# Patient Record
Sex: MALE
[De-identification: names, ages, dates, MRNs, and addresses within clinical notes are randomized per-mention and may not be internally consistent; named-entity substitution may affect disease eponyms.]

---

## 2017-01-23 ENCOUNTER — APPOINTMENT (OUTPATIENT)
Dept: ELECTROPHYSIOLOGY | Facility: CLINIC | Age: 57
End: 2017-01-23

## 2017-02-27 ENCOUNTER — APPOINTMENT (OUTPATIENT)
Dept: ELECTROPHYSIOLOGY | Facility: CLINIC | Age: 57
End: 2017-02-27

## 2017-03-30 ENCOUNTER — APPOINTMENT (OUTPATIENT)
Dept: ELECTROPHYSIOLOGY | Facility: CLINIC | Age: 57
End: 2017-03-30

## 2017-05-02 ENCOUNTER — APPOINTMENT (OUTPATIENT)
Dept: ELECTROPHYSIOLOGY | Facility: CLINIC | Age: 57
End: 2017-05-02

## 2017-07-05 ENCOUNTER — APPOINTMENT (OUTPATIENT)
Dept: ELECTROPHYSIOLOGY | Facility: CLINIC | Age: 57
End: 2017-07-05
Payer: COMMERCIAL

## 2017-07-05 VITALS — SYSTOLIC BLOOD PRESSURE: 135 MMHG | DIASTOLIC BLOOD PRESSURE: 86 MMHG

## 2017-07-05 PROCEDURE — 93291 INTERROG DEV EVAL SCRMS IP: CPT

## 2017-08-08 ENCOUNTER — APPOINTMENT (OUTPATIENT)
Dept: ELECTROPHYSIOLOGY | Facility: CLINIC | Age: 57
End: 2017-08-08
Payer: COMMERCIAL

## 2017-08-08 PROCEDURE — 93298 REM INTERROG DEV EVAL SCRMS: CPT

## 2017-08-08 PROCEDURE — 93299: CPT

## 2017-09-12 ENCOUNTER — APPOINTMENT (OUTPATIENT)
Dept: ELECTROPHYSIOLOGY | Facility: CLINIC | Age: 57
End: 2017-09-12
Payer: COMMERCIAL

## 2017-09-12 PROCEDURE — 93298 REM INTERROG DEV EVAL SCRMS: CPT

## 2017-09-12 PROCEDURE — 93299: CPT

## 2017-10-16 ENCOUNTER — APPOINTMENT (OUTPATIENT)
Dept: ELECTROPHYSIOLOGY | Facility: CLINIC | Age: 57
End: 2017-10-16
Payer: COMMERCIAL

## 2017-10-16 PROCEDURE — 93299: CPT

## 2017-10-16 PROCEDURE — 93298 REM INTERROG DEV EVAL SCRMS: CPT

## 2017-11-21 ENCOUNTER — APPOINTMENT (OUTPATIENT)
Dept: ELECTROPHYSIOLOGY | Facility: CLINIC | Age: 57
End: 2017-11-21
Payer: COMMERCIAL

## 2017-11-21 PROCEDURE — 93299: CPT

## 2017-11-21 PROCEDURE — 93298 REM INTERROG DEV EVAL SCRMS: CPT

## 2017-12-15 ENCOUNTER — MESSAGE (OUTPATIENT)
Age: 57
End: 2017-12-15

## 2017-12-18 ENCOUNTER — MESSAGE (OUTPATIENT)
Age: 57
End: 2017-12-18

## 2018-01-08 ENCOUNTER — APPOINTMENT (OUTPATIENT)
Dept: ELECTROPHYSIOLOGY | Facility: CLINIC | Age: 58
End: 2018-01-08
Payer: COMMERCIAL

## 2018-01-08 PROCEDURE — 93298 REM INTERROG DEV EVAL SCRMS: CPT

## 2018-01-08 PROCEDURE — 93299: CPT

## 2018-02-07 ENCOUNTER — APPOINTMENT (OUTPATIENT)
Dept: ELECTROPHYSIOLOGY | Facility: CLINIC | Age: 58
End: 2018-02-07
Payer: COMMERCIAL

## 2018-02-07 PROCEDURE — 93285 PRGRMG DEV EVAL SCRMS IP: CPT

## 2018-03-01 ENCOUNTER — APPOINTMENT (OUTPATIENT)
Dept: CARDIOLOGY | Facility: CLINIC | Age: 58
End: 2018-03-01

## 2018-03-06 ENCOUNTER — APPOINTMENT (OUTPATIENT)
Dept: ELECTROPHYSIOLOGY | Facility: CLINIC | Age: 58
End: 2018-03-06
Payer: COMMERCIAL

## 2018-03-12 ENCOUNTER — APPOINTMENT (OUTPATIENT)
Dept: ELECTROPHYSIOLOGY | Facility: CLINIC | Age: 58
End: 2018-03-12
Payer: COMMERCIAL

## 2018-03-12 PROCEDURE — 93298 REM INTERROG DEV EVAL SCRMS: CPT

## 2018-03-12 PROCEDURE — 93299: CPT

## 2018-03-20 ENCOUNTER — NON-APPOINTMENT (OUTPATIENT)
Age: 58
End: 2018-03-20

## 2018-03-20 ENCOUNTER — APPOINTMENT (OUTPATIENT)
Dept: ELECTROPHYSIOLOGY | Facility: CLINIC | Age: 58
End: 2018-03-20
Payer: COMMERCIAL

## 2018-03-20 VITALS
OXYGEN SATURATION: 96 % | DIASTOLIC BLOOD PRESSURE: 94 MMHG | BODY MASS INDEX: 30.34 KG/M2 | SYSTOLIC BLOOD PRESSURE: 148 MMHG | HEIGHT: 72 IN | HEART RATE: 85 BPM | WEIGHT: 224 LBS

## 2018-03-20 PROCEDURE — 99214 OFFICE O/P EST MOD 30 MIN: CPT

## 2018-03-20 PROCEDURE — 93000 ELECTROCARDIOGRAM COMPLETE: CPT

## 2018-04-23 ENCOUNTER — APPOINTMENT (OUTPATIENT)
Dept: ELECTROPHYSIOLOGY | Facility: CLINIC | Age: 58
End: 2018-04-23

## 2018-05-24 ENCOUNTER — OUTPATIENT (OUTPATIENT)
Dept: OUTPATIENT SERVICES | Facility: HOSPITAL | Age: 58
LOS: 1 days | Discharge: HOME | End: 2018-05-24
Payer: COMMERCIAL

## 2018-05-24 DIAGNOSIS — Z98.2 PRESENCE OF CEREBROSPINAL FLUID DRAINAGE DEVICE: Chronic | ICD-10-CM

## 2018-05-24 DIAGNOSIS — I48.0 PAROXYSMAL ATRIAL FIBRILLATION: ICD-10-CM

## 2018-05-24 PROCEDURE — 33284: CPT

## 2018-05-24 PROCEDURE — 33282: CPT | Mod: 59

## 2018-05-24 PROCEDURE — 93010 ELECTROCARDIOGRAM REPORT: CPT

## 2018-05-24 RX ORDER — SODIUM CHLORIDE 9 MG/ML
3 INJECTION INTRAMUSCULAR; INTRAVENOUS; SUBCUTANEOUS EVERY 8 HOURS
Qty: 0 | Refills: 0 | Status: DISCONTINUED | OUTPATIENT
Start: 2018-05-24 | End: 2018-06-08

## 2018-05-24 NOTE — CHART NOTE - NSCHARTNOTEFT_GEN_A_CORE
Removal of old ILR and implantation of new ILR  : Phong Davila MD  Assistant: none  Description of procedure: local anesthesia; removed old ILR and inserted new ILR after polymyxin irrigation  findings: Old ILR at [facility]; new ILR implanted  EBL: < 10 cc  Specimen removed: old ILR  no complications  Anesthesia: local only.     Phong Davila MD

## 2018-05-24 NOTE — H&P CARDIOLOGY - FAMILY HISTORY
Father  Still living? No  Family history of heart attack, Age at diagnosis: Age Unknown     Mother  Still living? No  Family history of heart attack, Age at diagnosis: Age Unknown

## 2018-05-24 NOTE — H&P CARDIOLOGY - PMH
Brain aneurysm  in 2013  Hypertension, unspecified type    PAF (paroxysmal atrial fibrillation)  not on AC, has ILR since 2014  Seizure disorder  ? last episode 5/21/18  Syncope, unspecified syncope type

## 2018-05-24 NOTE — H&P CARDIOLOGY - HISTORY OF PRESENT ILLNESS
57 y.o. male presents today for elective ILR explant and ILR implant since the device is at [facility].   The patient c/o syncopal episode on 5/21/18 while he was standing at work, at 8 am.  The patient denies chest pain, SOB, palpitations, dizziness, b/l lower extremities edema, presyncope, syncope, melena, hematochezia, fever, chills, abdominal pain, N/v/D/C, urinary symptoms.  The patient was evaluated in [facility], doesn't remember if he had CT or MRI, but claims that had many tests done and was told  that all were normal. He was d/c home the same day. The patient was evaluated by his neurologist yesterday, " seizure", schedule for EEG next week. Denies any recurrent syncopal episodes. 57 y.o. male presents today for elective ILR explant and ILR implant since the device is at [facility]. AF was recorded x 1 in 2017.   The patient c/o syncopal episode on 5/21/18 while he was standing at work, at 8 am.  The patient denies chest pain, SOB, palpitations, dizziness prior to episode. As per patient, he doesn't know how long he was unconscious and doesn't remember anything, however his co worker saw him to pass out and claimed that it was not too long and that the patient regained his consciousness and was talking before ambulance arrived.   The patient denies melena, hematochezia, fever, chills, abdominal pain, N/v/D/C, urinary symptoms.  The patient was evaluated in [facility], doesn't remember if he had CT or MRI, but claims that had many tests done and was told  that all were normal. Sutures x3 placed on R temporal area. He was d/c home the same day. The patient was evaluated by his neurologist yesterday, ?? episode of seizure, scheduled for EEG next week. Denies any recurrent syncopal episodes. The patient denies any complaints at present.

## 2018-05-24 NOTE — H&P CARDIOLOGY - ADDITIONAL PE
The area of previously placed sutures is clean and dry. NO surrounding erythema or discharge, healed well.

## 2018-06-07 ENCOUNTER — APPOINTMENT (OUTPATIENT)
Dept: ELECTROPHYSIOLOGY | Facility: CLINIC | Age: 58
End: 2018-06-07
Payer: COMMERCIAL

## 2018-06-07 PROCEDURE — 99024 POSTOP FOLLOW-UP VISIT: CPT

## 2018-07-09 ENCOUNTER — APPOINTMENT (OUTPATIENT)
Dept: ELECTROPHYSIOLOGY | Facility: CLINIC | Age: 58
End: 2018-07-09
Payer: COMMERCIAL

## 2018-07-09 PROCEDURE — 93299: CPT

## 2018-07-09 PROCEDURE — 93298 REM INTERROG DEV EVAL SCRMS: CPT

## 2018-07-17 PROBLEM — I48.0 PAROXYSMAL ATRIAL FIBRILLATION: Chronic | Status: ACTIVE | Noted: 2018-05-24

## 2018-07-17 PROBLEM — I67.1 CEREBRAL ANEURYSM, NONRUPTURED: Chronic | Status: ACTIVE | Noted: 2018-05-24

## 2018-07-17 PROBLEM — G40.909 EPILEPSY, UNSPECIFIED, NOT INTRACTABLE, WITHOUT STATUS EPILEPTICUS: Chronic | Status: ACTIVE | Noted: 2018-05-24

## 2018-09-10 ENCOUNTER — APPOINTMENT (OUTPATIENT)
Dept: ELECTROPHYSIOLOGY | Facility: CLINIC | Age: 58
End: 2018-09-10
Payer: COMMERCIAL

## 2018-09-10 PROCEDURE — 93298 REM INTERROG DEV EVAL SCRMS: CPT

## 2018-09-10 PROCEDURE — 93299: CPT

## 2018-09-13 PROBLEM — R55 SYNCOPE AND COLLAPSE: Chronic | Status: ACTIVE | Noted: 2018-05-24

## 2018-09-13 PROBLEM — I10 ESSENTIAL (PRIMARY) HYPERTENSION: Chronic | Status: ACTIVE | Noted: 2018-05-24

## 2018-11-05 ENCOUNTER — APPOINTMENT (OUTPATIENT)
Dept: ELECTROPHYSIOLOGY | Facility: CLINIC | Age: 58
End: 2018-11-05
Payer: COMMERCIAL

## 2018-11-05 PROCEDURE — 93299: CPT

## 2018-11-05 PROCEDURE — 93298 REM INTERROG DEV EVAL SCRMS: CPT

## 2018-12-13 ENCOUNTER — APPOINTMENT (OUTPATIENT)
Dept: ELECTROPHYSIOLOGY | Facility: CLINIC | Age: 58
End: 2018-12-13
Payer: COMMERCIAL

## 2018-12-13 VITALS — SYSTOLIC BLOOD PRESSURE: 146 MMHG | RESPIRATION RATE: 14 BRPM | DIASTOLIC BLOOD PRESSURE: 93 MMHG | HEART RATE: 75 BPM

## 2018-12-13 PROCEDURE — 93285 PRGRMG DEV EVAL SCRMS IP: CPT

## 2019-01-22 ENCOUNTER — APPOINTMENT (OUTPATIENT)
Dept: ELECTROPHYSIOLOGY | Facility: CLINIC | Age: 59
End: 2019-01-22
Payer: COMMERCIAL

## 2019-01-22 PROCEDURE — 93299: CPT

## 2019-01-22 PROCEDURE — 93298 REM INTERROG DEV EVAL SCRMS: CPT

## 2019-02-13 NOTE — H&P CARDIOLOGY - NS MD HP PULSE FEMORAL
Telephone Encounter by Yamileth Jorgensen RN at 11/05/18 04:08 PM     Author:  Yamileth Jorgensen RN Service:  (none) Author Type:  Registered Nurse     Filed:  11/05/18 04:09 PM Encounter Date:  11/5/2018 Status:  Signed     :  Yamileth Jorgensen RN (Registered Nurse)            Patient notified.[AH1.1T] Given message below, med list updated, patient booked to see[AH1.1M] [AH1.1T] on Thursday to further evaluate.[AH1.1M]       Revision History        User Key Date/Time User Provider Type Action    > AH1.1 11/05/18 04:09 PM Yamileth Jorgensen RN Registered Nurse Sign    M - Manual, T - Template             right normal/left normal

## 2019-02-25 ENCOUNTER — APPOINTMENT (OUTPATIENT)
Dept: ELECTROPHYSIOLOGY | Facility: CLINIC | Age: 59
End: 2019-02-25
Payer: COMMERCIAL

## 2019-02-25 PROCEDURE — 93299: CPT

## 2019-02-25 PROCEDURE — 93298 REM INTERROG DEV EVAL SCRMS: CPT

## 2019-03-26 ENCOUNTER — APPOINTMENT (OUTPATIENT)
Dept: ELECTROPHYSIOLOGY | Facility: CLINIC | Age: 59
End: 2019-03-26
Payer: COMMERCIAL

## 2019-03-26 PROCEDURE — 93298 REM INTERROG DEV EVAL SCRMS: CPT

## 2019-03-26 PROCEDURE — 93299: CPT

## 2019-04-29 ENCOUNTER — APPOINTMENT (OUTPATIENT)
Dept: ELECTROPHYSIOLOGY | Facility: CLINIC | Age: 59
End: 2019-04-29
Payer: COMMERCIAL

## 2019-04-29 PROCEDURE — 93298 REM INTERROG DEV EVAL SCRMS: CPT

## 2019-04-29 PROCEDURE — 93299: CPT

## 2019-05-30 ENCOUNTER — APPOINTMENT (OUTPATIENT)
Dept: ELECTROPHYSIOLOGY | Facility: CLINIC | Age: 59
End: 2019-05-30
Payer: COMMERCIAL

## 2019-05-30 PROCEDURE — 93299: CPT

## 2019-05-30 PROCEDURE — 93298 REM INTERROG DEV EVAL SCRMS: CPT

## 2019-06-24 ENCOUNTER — APPOINTMENT (OUTPATIENT)
Dept: ELECTROPHYSIOLOGY | Facility: CLINIC | Age: 59
End: 2019-06-24
Payer: COMMERCIAL

## 2019-06-24 PROCEDURE — 93285 PRGRMG DEV EVAL SCRMS IP: CPT

## 2019-07-25 ENCOUNTER — APPOINTMENT (OUTPATIENT)
Dept: ELECTROPHYSIOLOGY | Facility: CLINIC | Age: 59
End: 2019-07-25

## 2019-08-14 ENCOUNTER — APPOINTMENT (OUTPATIENT)
Dept: ELECTROPHYSIOLOGY | Facility: CLINIC | Age: 59
End: 2019-08-14
Payer: COMMERCIAL

## 2019-08-14 PROCEDURE — 93299: CPT

## 2019-08-14 PROCEDURE — 93298 REM INTERROG DEV EVAL SCRMS: CPT

## 2019-09-16 ENCOUNTER — APPOINTMENT (OUTPATIENT)
Dept: ELECTROPHYSIOLOGY | Facility: CLINIC | Age: 59
End: 2019-09-16
Payer: COMMERCIAL

## 2019-09-16 PROCEDURE — 93298 REM INTERROG DEV EVAL SCRMS: CPT

## 2019-09-16 PROCEDURE — 93299: CPT

## 2019-10-17 ENCOUNTER — APPOINTMENT (OUTPATIENT)
Dept: ELECTROPHYSIOLOGY | Facility: CLINIC | Age: 59
End: 2019-10-17
Payer: COMMERCIAL

## 2019-10-17 PROCEDURE — 93298 REM INTERROG DEV EVAL SCRMS: CPT

## 2019-10-17 PROCEDURE — 93299: CPT

## 2019-11-18 ENCOUNTER — APPOINTMENT (OUTPATIENT)
Dept: ELECTROPHYSIOLOGY | Facility: CLINIC | Age: 59
End: 2019-11-18
Payer: COMMERCIAL

## 2019-11-18 PROCEDURE — 93299: CPT

## 2019-11-18 PROCEDURE — 93298 REM INTERROG DEV EVAL SCRMS: CPT

## 2019-12-18 ENCOUNTER — APPOINTMENT (OUTPATIENT)
Dept: ELECTROPHYSIOLOGY | Facility: CLINIC | Age: 59
End: 2019-12-18
Payer: COMMERCIAL

## 2019-12-18 PROCEDURE — 93298 REM INTERROG DEV EVAL SCRMS: CPT

## 2019-12-18 PROCEDURE — 93299: CPT

## 2019-12-23 ENCOUNTER — APPOINTMENT (OUTPATIENT)
Dept: ELECTROPHYSIOLOGY | Facility: CLINIC | Age: 59
End: 2019-12-23
Payer: COMMERCIAL

## 2019-12-23 DIAGNOSIS — I48.0 PAROXYSMAL ATRIAL FIBRILLATION: ICD-10-CM

## 2019-12-23 PROCEDURE — 93285 PRGRMG DEV EVAL SCRMS IP: CPT

## 2020-01-27 ENCOUNTER — APPOINTMENT (OUTPATIENT)
Dept: ELECTROPHYSIOLOGY | Facility: CLINIC | Age: 60
End: 2020-01-27
Payer: COMMERCIAL

## 2020-01-27 PROCEDURE — 93298 REM INTERROG DEV EVAL SCRMS: CPT

## 2020-01-27 PROCEDURE — G2066: CPT

## 2020-02-27 ENCOUNTER — APPOINTMENT (OUTPATIENT)
Dept: ELECTROPHYSIOLOGY | Facility: CLINIC | Age: 60
End: 2020-02-27
Payer: COMMERCIAL

## 2020-02-27 PROCEDURE — 93298 REM INTERROG DEV EVAL SCRMS: CPT

## 2020-02-27 PROCEDURE — G2066: CPT

## 2020-03-30 ENCOUNTER — APPOINTMENT (OUTPATIENT)
Dept: ELECTROPHYSIOLOGY | Facility: CLINIC | Age: 60
End: 2020-03-30
Payer: COMMERCIAL

## 2020-03-30 PROCEDURE — G2066: CPT

## 2020-03-30 PROCEDURE — 93298 REM INTERROG DEV EVAL SCRMS: CPT

## 2020-04-30 ENCOUNTER — APPOINTMENT (OUTPATIENT)
Dept: ELECTROPHYSIOLOGY | Facility: CLINIC | Age: 60
End: 2020-04-30
Payer: COMMERCIAL

## 2020-04-30 PROCEDURE — 93298 REM INTERROG DEV EVAL SCRMS: CPT

## 2020-04-30 PROCEDURE — G2066: CPT

## 2020-06-01 ENCOUNTER — APPOINTMENT (OUTPATIENT)
Dept: ELECTROPHYSIOLOGY | Facility: CLINIC | Age: 60
End: 2020-06-01
Payer: COMMERCIAL

## 2020-06-01 PROCEDURE — G2066: CPT

## 2020-06-01 PROCEDURE — 93298 REM INTERROG DEV EVAL SCRMS: CPT

## 2020-07-06 ENCOUNTER — APPOINTMENT (OUTPATIENT)
Dept: ELECTROPHYSIOLOGY | Facility: CLINIC | Age: 60
End: 2020-07-06
Payer: COMMERCIAL

## 2020-07-06 PROCEDURE — G2066: CPT

## 2020-07-06 PROCEDURE — 93298 REM INTERROG DEV EVAL SCRMS: CPT

## 2020-08-11 ENCOUNTER — APPOINTMENT (OUTPATIENT)
Dept: ELECTROPHYSIOLOGY | Facility: CLINIC | Age: 60
End: 2020-08-11
Payer: COMMERCIAL

## 2020-08-11 PROCEDURE — G2066: CPT

## 2020-08-11 PROCEDURE — 93298 REM INTERROG DEV EVAL SCRMS: CPT

## 2020-09-15 ENCOUNTER — APPOINTMENT (OUTPATIENT)
Dept: ELECTROPHYSIOLOGY | Facility: CLINIC | Age: 60
End: 2020-09-15
Payer: COMMERCIAL

## 2020-09-15 PROCEDURE — G2066: CPT

## 2020-09-15 PROCEDURE — 93298 REM INTERROG DEV EVAL SCRMS: CPT

## 2020-10-20 ENCOUNTER — APPOINTMENT (OUTPATIENT)
Dept: ELECTROPHYSIOLOGY | Facility: CLINIC | Age: 60
End: 2020-10-20
Payer: COMMERCIAL

## 2020-10-20 PROCEDURE — G2066: CPT

## 2020-10-20 PROCEDURE — 93298 REM INTERROG DEV EVAL SCRMS: CPT

## 2020-11-24 ENCOUNTER — APPOINTMENT (OUTPATIENT)
Dept: ELECTROPHYSIOLOGY | Facility: CLINIC | Age: 60
End: 2020-11-24
Payer: COMMERCIAL

## 2020-11-24 PROCEDURE — G2066: CPT

## 2020-11-24 PROCEDURE — 93298 REM INTERROG DEV EVAL SCRMS: CPT

## 2020-12-28 ENCOUNTER — APPOINTMENT (OUTPATIENT)
Dept: ELECTROPHYSIOLOGY | Facility: CLINIC | Age: 60
End: 2020-12-28
Payer: COMMERCIAL

## 2020-12-28 VITALS — SYSTOLIC BLOOD PRESSURE: 158 MMHG | DIASTOLIC BLOOD PRESSURE: 97 MMHG

## 2020-12-28 PROCEDURE — 99072 ADDL SUPL MATRL&STAF TM PHE: CPT

## 2020-12-28 PROCEDURE — 93285 PRGRMG DEV EVAL SCRMS IP: CPT

## 2021-01-03 ENCOUNTER — APPOINTMENT (OUTPATIENT)
Dept: DISASTER EMERGENCY | Facility: CLINIC | Age: 61
End: 2021-01-03

## 2021-01-04 LAB — SARS-COV-2 N GENE NPH QL NAA+PROBE: NOT DETECTED

## 2021-01-06 ENCOUNTER — OUTPATIENT (OUTPATIENT)
Dept: OUTPATIENT SERVICES | Facility: HOSPITAL | Age: 61
LOS: 1 days | Discharge: HOME | End: 2021-01-06
Payer: COMMERCIAL

## 2021-01-06 DIAGNOSIS — Z98.2 PRESENCE OF CEREBROSPINAL FLUID DRAINAGE DEVICE: Chronic | ICD-10-CM

## 2021-01-06 DIAGNOSIS — R55 SYNCOPE AND COLLAPSE: ICD-10-CM

## 2021-01-06 DIAGNOSIS — Z98.890 OTHER SPECIFIED POSTPROCEDURAL STATES: Chronic | ICD-10-CM

## 2021-01-06 PROCEDURE — 33285 INSJ SUBQ CAR RHYTHM MNTR: CPT

## 2021-01-06 RX ORDER — MULTIVIT-MIN/FERROUS GLUCONATE 9 MG/15 ML
1 LIQUID (ML) ORAL
Qty: 0 | Refills: 0 | DISCHARGE

## 2021-01-06 RX ORDER — LEVETIRACETAM 250 MG/1
1 TABLET, FILM COATED ORAL
Qty: 0 | Refills: 0 | DISCHARGE

## 2021-01-06 NOTE — H&P CARDIOLOGY - REVIEW OF SYSTEMS
The patient denies chest pain, SOB, palpitations, dizziness, presyncope, syncope,  headache, visual disturbances,  PE, DVT, abdominal pain, N/V/D/C, hematochezia, melena, dysuria, hematuria, fever, chills.

## 2021-01-06 NOTE — H&P CARDIOLOGY - HISTORY OF PRESENT ILLNESS
60 y.o. male presents today for elective ILR explant.   The patient was diagnosed with Brain aneurysm, had ILR placed in 2014 and replaced in 2017, was noted to have one episode of A. Fib in 2017. As per Dr. Davila, there was no evidence of recurrent A. Fib on the recording and the decision was made to remove ILR. The patient denies chest pain, SOB, palpitations, dizziness, presyncope, syncope,  headache, visual disturbances, PE, DVT, abdominal pain, N/V/D/C, hematochezia, melena, dysuria, hematuria, fever, chills.

## 2021-01-06 NOTE — CHART NOTE - NSCHARTNOTEFT_GEN_A_CORE
Type of Procedure: Implantable Loop Recorder Explant  Licensed independent practitioner: Phong Davila MD  Assistant: none  Description of procedure: sterile conditions, ILR explanted 4th ICS left parasternal area  Findings of procedure: Successful removal of ILR  Estimated blood loss: < 10 cc  Specimen removed: none  Preoperative Dx: ILR EOS; h/o syncope and PAF  Postoperative Dx: ILR EOS  Complications: none  Anesthesia type: local anesthesia    Phong Davila MD

## 2021-01-20 ENCOUNTER — APPOINTMENT (OUTPATIENT)
Dept: ELECTROPHYSIOLOGY | Facility: CLINIC | Age: 61
End: 2021-01-20

## 2021-02-01 ENCOUNTER — APPOINTMENT (OUTPATIENT)
Dept: ELECTROPHYSIOLOGY | Facility: CLINIC | Age: 61
End: 2021-02-01

## 2023-08-22 NOTE — H&P CARDIOLOGY - HIV STATUS
LABS:                        14.5   6.15  )-----------( 355      ( 21 Aug 2023 23:50 )             44.9     Hgb Trend: 14.5<--  08-21    141  |  104  |  20  ----------------------------<  102<H>  4.8   |  23  |  0.62    Ca    10.1      21 Aug 2023 23:50    TPro  7.4  /  Alb  4.7  /  TBili  0.2  /  DBili  x   /  AST  21  /  ALT  14  /  AlkPhos  32<L>  08-21    Creatinine Trend: 0.62<--  LIVER FUNCTIONS - ( 21 Aug 2023 23:50 )  Alb: 4.7 g/dL / Pro: 7.4 g/dL / ALK PHOS: 32 U/L / ALT: 14 U/L / AST: 21 U/L / GGT: x                 Urinalysis Basic - ( 21 Aug 2023 23:50 )    Color: x / Appearance: x / SG: x / pH: x  Gluc: 102 mg/dL / Ketone: x  / Bili: x / Urobili: x   Blood: x / Protein: x / Nitrite: x   Leuk Esterase: x / RBC: x / WBC x   Sq Epi: x / Non Sq Epi: x / Bacteria: x
Negative/Unknown

## 2024-03-12 ENCOUNTER — APPOINTMENT (OUTPATIENT)
Dept: UROLOGY | Facility: CLINIC | Age: 64
End: 2024-03-12
Payer: COMMERCIAL

## 2024-03-12 DIAGNOSIS — R55 SYNCOPE AND COLLAPSE: ICD-10-CM

## 2024-03-12 DIAGNOSIS — Z01.818 ENCOUNTER FOR OTHER PREPROCEDURAL EXAMINATION: ICD-10-CM

## 2024-03-12 DIAGNOSIS — Z82.49 FAMILY HISTORY OF ISCHEMIC HEART DISEASE AND OTHER DISEASES OF THE CIRCULATORY SYSTEM: ICD-10-CM

## 2024-03-12 DIAGNOSIS — Z78.9 OTHER SPECIFIED HEALTH STATUS: ICD-10-CM

## 2024-03-12 PROCEDURE — 99204 OFFICE O/P NEW MOD 45 MIN: CPT

## 2024-03-12 RX ORDER — HYDROCHLOROTHIAZIDE 12.5 MG/1
12.5 TABLET ORAL
Qty: 30 | Refills: 6 | Status: ACTIVE | COMMUNITY
Start: 2024-03-12

## 2024-03-12 RX ORDER — CLOPIDOGREL BISULFATE 75 MG/1
75 TABLET, FILM COATED ORAL DAILY
Qty: 30 | Refills: 0 | Status: ACTIVE | COMMUNITY
Start: 2024-03-12

## 2024-03-12 RX ORDER — LOSARTAN POTASSIUM 100 MG/1
100 TABLET, FILM COATED ORAL
Qty: 90 | Refills: 1 | Status: ACTIVE | COMMUNITY
Start: 2024-03-12

## 2024-03-12 RX ORDER — MULTIVIT-MINS/IRON/FOLIC/LYCOP 8-200-600
TABLET ORAL DAILY
Qty: 30 | Refills: 0 | Status: ACTIVE | COMMUNITY
Start: 2024-03-12

## 2024-03-12 NOTE — HISTORY OF PRESENT ILLNESS
[Verbal consent obtained from patient] : the patient, [unfilled] [FreeTextEntry1] : Dear Dr. Rayshawn Joshi (PCP)   Thank you so much for the referral to help care for your patient.   Chief Complaint: elevated PSA Date of first visit: 3/12/2024 Occupation:    SARAH KATHLEEN is a 63-year-old Black man, with PMHx of brain aneurysm, hypertension, seizures, and MARCY who presents for elevated PSA. His PSA is 4.58 ng/mL, drawn on 2/16/24. His historical PSA results are detailed below. He has not had a pelvic MRI nor a prostate biopsy. He denies any family history of  malignancies.   LUTS History Denies  Morning erections present; no issues completing intercourse.   PSA History 4.58 ng/mL on 2/16/24 2.31 ng/mL on 3/30/23 1.76 ng/mL on 6/3/22   MRI History N/A   Biopsy History N/A   The patient denies fevers, chills, nausea and or vomiting and no unexplained weight loss.   All pertinent laboratory and physician notes were reviewed.  Questionnaire results were discussed with patient.   03/12/2024 IPSS 0 QOL 0 IIEF: 22   Prostate cancer screening: the patient and I spoke at length about prostate cancer screening, its risks and its benefits. The patient has 2 (age, elevated PSA) risk factors for prostate cancer.  He understands that many men with prostate cancer will die with the disease rather than of it and we also discussed the results large multi-center American and  prostate cancer screening trials. He also understands that PSA in and of itself does not diagnose prostate cancer but only assesses risk to a certain degree.   The patient understands that to definitively screen for prostate cancer, a biopsy is required, and this procedure has risks, including bleeding, infection, ED and urinary retention. The patient opted to proceed with a fusion biopsy.

## 2024-03-12 NOTE — PHYSICAL EXAM
[Normal Appearance] : normal appearance [] : no respiratory distress [Oriented To Time, Place, And Person] : oriented to person, place, and time [No Focal Deficits] : no focal deficits [Mood] : the mood was normal [Affect] : the affect was normal [Not Anxious] : not anxious

## 2024-03-12 NOTE — ASSESSMENT
[FreeTextEntry1] : SARAH KATHLEEN is a 63-year-old Black man, with PMHx of brain aneurysm, hypertension, seizures, and MARCY who presents for elevated PSA. His PSA is 4.58 ng/mL, drawn on 2/16/24. He has not had a pelvic MRI nor a prostate biopsy. He denies any family history of  malignancies.   LUTS History Denies  Discussed transperineal fusion guided biopsy with the patient today. Explained to patient that the MRI images and transrectal ultrasound images allows us to retrieve biopsy samples from the lesion seen on the MRI. We will also take samples from a 12-core biopsy template of the prostate to assess for the presence of clinically significant cancer. Discussed the use of local anesthesia for this procedure, in addition to the option for a mild oral sedative. Reviewed the importance of a fleet enema the morning of the procedure. Discussed with patient that a transperineal approach has a low risk for infection. Discussed risks and benefits of a transperineal fusion biopsy.   Patient agrees to move forward with transperineal biopsy with Dr. Clark. A written copy of instructions has been sent to the email address on file. Patient verbalized understanding of the procedure and instructions prior to his biopsy appointment.  1. Redraw PSA, if still elevated, then: 2. Schedule MRI - MRI needed for fusion biopsy 3. Schedule MRI review appointment with Dr. Clark 4. Schedule TP biopsy at [address] [place] [id number] with Dr. Clark 5. Schedule post biopsy review appointment with Dr. Clark

## 2024-03-16 ENCOUNTER — TRANSCRIPTION ENCOUNTER (OUTPATIENT)
Age: 64
End: 2024-03-16

## 2024-03-18 LAB
PSA FREE FLD-MCNC: 25 %
PSA FREE SERPL-MCNC: 0.99 NG/ML
PSA SERPL-MCNC: 3.93 NG/ML

## 2024-03-28 ENCOUNTER — APPOINTMENT (OUTPATIENT)
Dept: MRI IMAGING | Facility: CLINIC | Age: 64
End: 2024-03-28

## 2024-04-12 ENCOUNTER — NON-APPOINTMENT (OUTPATIENT)
Age: 64
End: 2024-04-12

## 2024-04-16 ENCOUNTER — APPOINTMENT (OUTPATIENT)
Dept: UROLOGY | Facility: CLINIC | Age: 64
End: 2024-04-16
Payer: COMMERCIAL

## 2024-04-16 DIAGNOSIS — R97.20 ELEVATED PROSTATE, SPECIFIC ANTIGEN [PSA]: ICD-10-CM

## 2024-04-16 PROCEDURE — 99213 OFFICE O/P EST LOW 20 MIN: CPT

## 2024-04-16 NOTE — HISTORY OF PRESENT ILLNESS
[FreeTextEntry1] : SARAH KATHLEEN is a 63-year-old Black man, with PMHx of brain aneurysm, hypertension, seizures, and MARCY who presents for elevated PSA. His PSA is 4.58 ng/mL, drawn on 2/16/24. His historical PSA results are detailed below. He has not had a pelvic MRI nor a prostate biopsy. He denies any family history of  malignancies.  LUTS History Denies Morning erections present; no issues completing intercourse.  PSA History 4.58 ng/mL on 2/16/24 2.31 ng/mL on 3/30/23 1.76 ng/mL on 6/3/22   4/24 had MRI at [facility]. Noted 54cc prostate and PIRADS 3 lesion - anterior. f/u PSA less 4   .

## 2024-04-16 NOTE — ASSESSMENT
[FreeTextEntry1] : reviewed his data - with PSA density <0.1, PORADs 3 and anterior lesion the likelihood of a CSC %15 reviewed having a biopsy versus waiting to follow PSA. plan for the later

## 2024-04-18 ENCOUNTER — APPOINTMENT (OUTPATIENT)
Dept: SURGERY | Facility: CLINIC | Age: 64
End: 2024-04-18
Payer: COMMERCIAL

## 2024-04-18 VITALS
WEIGHT: 230 LBS | BODY MASS INDEX: 32.2 KG/M2 | HEART RATE: 76 BPM | HEIGHT: 71 IN | DIASTOLIC BLOOD PRESSURE: 74 MMHG | SYSTOLIC BLOOD PRESSURE: 120 MMHG

## 2024-04-18 DIAGNOSIS — J39.2 OTHER DISEASES OF PHARYNX: ICD-10-CM

## 2024-04-18 DIAGNOSIS — R22.1 LOCALIZED SWELLING, MASS AND LUMP, NECK: ICD-10-CM

## 2024-04-18 PROCEDURE — 31575 DIAGNOSTIC LARYNGOSCOPY: CPT

## 2024-04-18 PROCEDURE — 99204 OFFICE O/P NEW MOD 45 MIN: CPT | Mod: 25

## 2024-04-18 NOTE — CONSULT LETTER
[Dear  ___] : Dear  [unfilled], [Consult Letter:] : I had the pleasure of evaluating your patient, [unfilled]. [Please see my note below.] : Please see my note below. [Consult Closing:] : Thank you very much for allowing me to participate in the care of this patient.  If you have any questions, please do not hesitate to contact me. [Sincerely,] : Sincerely, [FreeTextEntry2] : Dr. Rayshawn Joshi [FreeTextEntry3] : Polo Peña MD, FACS System Director, Endocrine Surgery [facility] Associate  Professor of Surgery [employer] at [facility]

## 2024-04-18 NOTE — ASSESSMENT
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 asked to be evaluated by dr brown since I do not treat this condition. name and number given and message left for him regarding patient. patient has been given the opportunity to ask questions, and all of the patient's questions have been answered to their satisfaction

## 2024-04-18 NOTE — HISTORY OF PRESENT ILLNESS
[de-identified] : Pt c/o Neck mass for 2 months.  denies dysphagia, hoarseness, SOB or RT exposure sonogram: Right neck 5.6 cm heterogenous mass predominately  solid TSH 1.45, calcium 9.4 I have reviewed all old and new data and available images.  Additional information was obtained from others present at the time of visit to ensure the completeness of the history

## 2024-04-18 NOTE — PHYSICAL EXAM
[de-identified] : no palpable thyroid nodules [Nasal Endoscopy Performed] : nasal endoscopy was performed, see procedure section for findings [Laryngoscopy Performed] : laryngoscopy was performed, see procedure section for findings [Midline] : located in midline position [Normal] : orientation to person, place, and time: normal [FreeTextEntry1] : hot potato voice [de-identified] : 5 cm right neck mass, firm, well circumscribed [de-identified] : fiberoptic laryngoscopy shows right lateral pharyngeal mass overlying epiglottis, preventing visualization of larynx below.

## 2024-04-22 ENCOUNTER — APPOINTMENT (OUTPATIENT)
Dept: OTOLARYNGOLOGY | Facility: CLINIC | Age: 64
End: 2024-04-22
Payer: COMMERCIAL

## 2024-04-22 VITALS
WEIGHT: 230 LBS | BODY MASS INDEX: 32.2 KG/M2 | HEART RATE: 77 BPM | DIASTOLIC BLOOD PRESSURE: 80 MMHG | HEIGHT: 71 IN | SYSTOLIC BLOOD PRESSURE: 125 MMHG | OXYGEN SATURATION: 97 %

## 2024-04-22 PROCEDURE — 99204 OFFICE O/P NEW MOD 45 MIN: CPT | Mod: 25

## 2024-04-22 PROCEDURE — 31575 DIAGNOSTIC LARYNGOSCOPY: CPT

## 2024-04-22 NOTE — PHYSICAL EXAM
[Midline] : trachea located in midline position [Normal] : no rashes [de-identified] :  5 cm right level 2  neck mass, firm, nontender

## 2024-04-22 NOTE — ASSESSMENT
[FreeTextEntry1] : 63 year old male presents for 5.6 cm right level neck mass associated cervical adenopathy   -Reviewed imaging in depth with pt and his wife  --Discussed possible etiologies at length including HPV-related OPSCC, Lymphoma, Vascular malformations. -Fiberoptic exam showed a right sided bulging pharyngeal wall mass, possible secondary to mass effect from neck mass vs vascular malformation vs primary malignancy  -Will obtain neck MRI and neck CT to better delineate mass and associated lymphadenopathy.  -Will obtain PET CT for staging -Follow up in 2 weeks to discuss imaging and next steps including likely FNA needle biopsy  -They are in agreement with this plan

## 2024-04-22 NOTE — HISTORY OF PRESENT ILLNESS
[de-identified] : 63 year old male presents for 5.6 cm right neck mass lateral to the right submandibular gland with associated cervical adenopathy.   He first noted right neck mass about 2 months ago. He reports fluctuates in size. Experiences intermittent dysphonia since before mass was found. Denies dysphagia, dyspnea. Reports increasing congestion.  Denies unintentional weight loss, night sweats, or chills.  No smoking history.  No cancer history in the family.    4/11/24: US FINDINGS: Palpable mass on the right side of the neck lateral to the right submandibular gland corresponds to a 4.2 x 5.6 cm vascular heterogeneous predominately hypochoic solid mass.. Cervical lymph nodes are seen as follows: On the right: Level 3: A 1.8 x 0.8 x 1.7 cm lymph node with a fatty echogenic hilum and normal thin cortex, an adjacent 1.5 x 0.6 1.8 cm lymph node with a fatty echogenic hilum and normal thin cortex, an adjacent 1.8 x 0.6 x 1.6 cm lymph node a fatty echogenic hilum and normal thin cortex, an adjacent 1.1 x 0.9 x 1.2 cm lymph node with a fatty echogenic h and normal thin cortex Level 4: A 1.7 x 0.8 x 0.8 cm lymph node with a fatty echogenic hilum and normal thin cortex On the left: Level 2: A 2.5 x 1.2 x 1.6 cm lymph node with a fatty echogenic hilum and normal thin cortex Level 3: A 1.3 x 0.6 x 0.9 cm lymph node with a fatty echogenic hilum and normal thin cortex, an adjacent 1.8 x 0.4 1.4 cm lymph node with a fatty echogenic hilum and normal thin cortex. The submandibular and parotid glands are symmetric and homogeneous in appearance bilaterally. IMPRESSION: Palpable mass corresponds to a 5 6 cm vascular hypochoic solid mass lateral to the right submandibular gland. Neoplasm is not excluded. Recommend MRI of the neck without and with contrast for further evaluation.

## 2024-04-23 ENCOUNTER — APPOINTMENT (OUTPATIENT)
Dept: UROLOGY | Facility: CLINIC | Age: 64
End: 2024-04-23

## 2024-05-02 ENCOUNTER — RESULT REVIEW (OUTPATIENT)
Age: 64
End: 2024-05-02

## 2024-05-07 ENCOUNTER — APPOINTMENT (OUTPATIENT)
Dept: ULTRASOUND IMAGING | Facility: IMAGING CENTER | Age: 64
End: 2024-05-07
Payer: COMMERCIAL

## 2024-05-07 ENCOUNTER — RESULT REVIEW (OUTPATIENT)
Age: 64
End: 2024-05-07

## 2024-05-07 ENCOUNTER — OUTPATIENT (OUTPATIENT)
Dept: OUTPATIENT SERVICES | Facility: HOSPITAL | Age: 64
LOS: 1 days | End: 2024-05-07
Payer: COMMERCIAL

## 2024-05-07 DIAGNOSIS — Z98.2 PRESENCE OF CEREBROSPINAL FLUID DRAINAGE DEVICE: Chronic | ICD-10-CM

## 2024-05-07 DIAGNOSIS — R22.1 LOCALIZED SWELLING, MASS AND LUMP, NECK: ICD-10-CM

## 2024-05-07 PROCEDURE — 88342 IMHCHEM/IMCYTCHM 1ST ANTB: CPT | Mod: 26

## 2024-05-07 PROCEDURE — 88365 INSITU HYBRIDIZATION (FISH): CPT

## 2024-05-07 PROCEDURE — 76942 ECHO GUIDE FOR BIOPSY: CPT | Mod: 26

## 2024-05-07 PROCEDURE — 88172 CYTP DX EVAL FNA 1ST EA SITE: CPT

## 2024-05-07 PROCEDURE — 88184 FLOWCYTOMETRY/ TC 1 MARKER: CPT

## 2024-05-07 PROCEDURE — 88365 INSITU HYBRIDIZATION (FISH): CPT | Mod: 26

## 2024-05-07 PROCEDURE — 88189 FLOWCYTOMETRY/READ 16 & >: CPT

## 2024-05-07 PROCEDURE — 20206 BIOPSY MUSCLE PERQ NEEDLE: CPT

## 2024-05-07 PROCEDURE — 87205 SMEAR GRAM STAIN: CPT

## 2024-05-07 PROCEDURE — 76942 ECHO GUIDE FOR BIOPSY: CPT

## 2024-05-07 PROCEDURE — 88185 FLOWCYTOMETRY/TC ADD-ON: CPT

## 2024-05-07 PROCEDURE — 88173 CYTOPATH EVAL FNA REPORT: CPT | Mod: 26

## 2024-05-07 PROCEDURE — 88341 IMHCHEM/IMCYTCHM EA ADD ANTB: CPT

## 2024-05-07 PROCEDURE — 88305 TISSUE EXAM BY PATHOLOGIST: CPT | Mod: 26

## 2024-05-07 PROCEDURE — 88173 CYTOPATH EVAL FNA REPORT: CPT

## 2024-05-07 PROCEDURE — 88341 IMHCHEM/IMCYTCHM EA ADD ANTB: CPT | Mod: 26

## 2024-05-07 PROCEDURE — 88342 IMHCHEM/IMCYTCHM 1ST ANTB: CPT

## 2024-05-07 PROCEDURE — 88305 TISSUE EXAM BY PATHOLOGIST: CPT

## 2024-05-10 ENCOUNTER — APPOINTMENT (OUTPATIENT)
Dept: UROLOGY | Facility: CLINIC | Age: 64
End: 2024-05-10

## 2024-05-15 ENCOUNTER — APPOINTMENT (OUTPATIENT)
Dept: NUCLEAR MEDICINE | Facility: IMAGING CENTER | Age: 64
End: 2024-05-15

## 2024-05-15 ENCOUNTER — APPOINTMENT (OUTPATIENT)
Dept: CT IMAGING | Facility: IMAGING CENTER | Age: 64
End: 2024-05-15

## 2024-05-15 LAB — NON-GYNECOLOGICAL CYTOLOGY STUDY: SIGNIFICANT CHANGE UP

## 2024-05-17 ENCOUNTER — APPOINTMENT (OUTPATIENT)
Dept: MRI IMAGING | Facility: IMAGING CENTER | Age: 64
End: 2024-05-17

## 2024-05-20 ENCOUNTER — APPOINTMENT (OUTPATIENT)
Dept: OTOLARYNGOLOGY | Facility: CLINIC | Age: 64
End: 2024-05-20
Payer: COMMERCIAL

## 2024-05-20 VITALS
OXYGEN SATURATION: 97 % | HEART RATE: 90 BPM | WEIGHT: 230 LBS | DIASTOLIC BLOOD PRESSURE: 80 MMHG | SYSTOLIC BLOOD PRESSURE: 127 MMHG | BODY MASS INDEX: 32.2 KG/M2 | HEIGHT: 71 IN

## 2024-05-20 PROCEDURE — 99214 OFFICE O/P EST MOD 30 MIN: CPT | Mod: 25

## 2024-05-20 PROCEDURE — 31575 DIAGNOSTIC LARYNGOSCOPY: CPT

## 2024-05-20 NOTE — ASSESSMENT
[FreeTextEntry1] : 63 year old male presents for follow up of 5.6 cm right level neck mass associated cervical adenopathy  Pathology consistent with SCC  -Reviewed imaging and pathology in depth with pt and his wife  --Discussed etiologies at length including HPV-related OPSCC -- right inferior tonsillar pole mass consistent with primary site of disease.  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

## 2024-05-20 NOTE — HISTORY OF PRESENT ILLNESS
[de-identified] : 63 year old male presents for follow up of a 5.6 cm right neck mass lateral to the right submandibular gland with associated cervical adenopathy.  FNA from 5/7 showed pathology consistent with Metastatic nonkeratinizing squamous cell carcinoma. No changes in symptoms since last clinic visit.  Denies recent fevers/infections, chills, night sweats, unintentional weight loss.   MRI Neck 5/9/2024 Impression: Large right oropharyngeal/hypopharyngeal mass with right level 2 and level 3 adenopathy, with a dominant 5.5 cm x 4.5 cm right level 2 lymph node.   CT Soft Tissue Neck IMPRESSION: Mildly lobulated ovoid soft tissue density positioned between the sternocleidomastoid, parotid, submandibular gland and carotid sheath on the right. Differential diagnosis includes parotid neoplasm, schwannoma, paraganglioma, metastasis among other etiologies. Biopsy can be performed as clinically indicated.  Right lingual/palatine tonsil soft tissue prominence and protrusion with into the oropharynx measuring 3.1 x 1.9 x 3.6 cm suspicious neoplastic process without definite focal enhancement. Contact with the mucosal surface on the opposite side of the oropharynx. Prominent posterior nasopharyngeal soft tissue without definite focal lesion. Direct visualization is recommended. Biopsy can be performed as indicated. A few bilateral enlarged lymph nodes may reflect metastatic disease. Clinical correlation recommended. Status post left parietal craniotomy. 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 Suboptimal evaluation of cerebral parenchyma secondary to streak artifact. Clinical correlation and comparison with prior imaging is recommended if available. MRI and/or MRA of the brain with and without contrast can be performed as clinically indicated.  He first noted right neck mass about 3 months ago. He reports fluctuates in size. Experiences intermittent dysphonia since before mass was found. Denies dysphagia, dyspnea. Reports increasing congestion.  Denies unintentional weight loss, night sweats, or chills.  No smoking history.  No cancer history in the family.     4/25/24-CT: IMPRESSION Mildly lobulated ovoid soft tissue density positioned between the sternocleidomastoid, parotid, submandibular gland and carotid sheath on the right. Differential diagnosis includes parotid neoplasm, schwannoma, paraganglioma, metastasis among other etiologies. Biopsy can be performed as clinically indicated.  Right lingual/palatine tonsil soft tissue prominence and protrusion with into the oropharynx measuring 3.1 x 1.9 x 3.6 cm suspicious neoplastic process without definite focal enhancement. Contact with the mucosal surface on the opposite side of the oropharynx. Prominent posterior nasopharyngeal soft tissue without definite focal lesion. Direct visualization is recommended. Biopsy can be performed as indicated. A few bilateral enlarged lymph nodes may reflect metastatic disease. Clinical correlation recommended.  Status post left parietal craniotomy. 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 Suboptimal evaluation of cerebral parenchyma secondary to streak artifact. Clinical correlation and comparison with prior imaging is recommended if available. MRI and/or MRA of the brain with and without contrast can be performed as clinically indicated . 5/9/24-MRI: Large right oropharyngeal/hypopharyngeal mass with right level 2 and level 3 adenoathy, with a dominant 5.5 cm x 4.5 cm right level 2 lymph node.  4/11/24: US FINDINGS: Palpable mass on the right side of the neck lateral to the right submandibular gland corresponds to a 4.2 x 5.6 cm vascular heterogeneous predominately hypochoic solid mass.. Cervical lymph nodes are seen as follows: On the right: Level 3: A 1.8 x 0.8 x 1.7 cm lymph node with a fatty echogenic hilum and normal thin cortex, an adjacent 1.5 x 0.6 1.8 cm lymph node with a fatty echogenic hilum and normal thin cortex, an adjacent 1.8 x 0.6 x 1.6 cm lymph node a fatty echogenic hilum and normal thin cortex, an adjacent 1.1 x 0.9 x 1.2 cm lymph node with a fatty echogenic h and normal thin cortex Level 4: A 1.7 x 0.8 x 0.8 cm lymph node with a fatty echogenic hilum and normal thin cortex On the left: Level 2: A 2.5 x 1.2 x 1.6 cm lymph node with a fatty echogenic hilum and normal thin cortex Level 3: A 1.3 x 0.6 x 0.9 cm lymph node with a fatty echogenic hilum and normal thin cortex, an adjacent 1.8 x 0.4 1.4 cm lymph node with a fatty echogenic hilum and normal thin cortex. The submandibular and parotid glands are symmetric and homogeneous in appearance bilaterally. IMPRESSION: Palpable mass corresponds to a 5 6 cm vascular hypochoic solid mass lateral to the right submandibular gland. Neoplasm is not excluded. Recommend MRI of the neck without and with contrast for further evaluation.

## 2024-05-20 NOTE — PHYSICAL EXAM
[Midline] : trachea located in midline position [Normal] : no rashes [de-identified] :  5 cm right level 2  neck mass, firm, nontender

## 2024-05-21 ENCOUNTER — OUTPATIENT (OUTPATIENT)
Dept: OUTPATIENT SERVICES | Facility: HOSPITAL | Age: 64
LOS: 1 days | Discharge: HOME | End: 2024-05-21
Payer: COMMERCIAL

## 2024-05-21 DIAGNOSIS — Z98.890 OTHER SPECIFIED POSTPROCEDURAL STATES: Chronic | ICD-10-CM

## 2024-05-21 DIAGNOSIS — Z98.2 PRESENCE OF CEREBROSPINAL FLUID DRAINAGE DEVICE: Chronic | ICD-10-CM

## 2024-05-29 ENCOUNTER — OUTPATIENT (OUTPATIENT)
Dept: OUTPATIENT SERVICES | Facility: HOSPITAL | Age: 64
LOS: 1 days | Discharge: HOME | End: 2024-05-29

## 2024-05-29 DIAGNOSIS — C44.92 SQUAMOUS CELL CARCINOMA OF SKIN, UNSPECIFIED: ICD-10-CM

## 2024-05-30 ENCOUNTER — APPOINTMENT (OUTPATIENT)
Dept: NUCLEAR MEDICINE | Facility: IMAGING CENTER | Age: 64
End: 2024-05-30

## 2024-06-03 ENCOUNTER — APPOINTMENT (OUTPATIENT)
Dept: NUCLEAR MEDICINE | Facility: IMAGING CENTER | Age: 64
End: 2024-06-03
Payer: COMMERCIAL

## 2024-06-03 ENCOUNTER — OUTPATIENT (OUTPATIENT)
Dept: OUTPATIENT SERVICES | Facility: HOSPITAL | Age: 64
LOS: 1 days | End: 2024-06-03
Payer: COMMERCIAL

## 2024-06-03 DIAGNOSIS — R22.1 LOCALIZED SWELLING, MASS AND LUMP, NECK: ICD-10-CM

## 2024-06-03 DIAGNOSIS — R59.0 LOCALIZED ENLARGED LYMPH NODES: ICD-10-CM

## 2024-06-03 DIAGNOSIS — Z98.2 PRESENCE OF CEREBROSPINAL FLUID DRAINAGE DEVICE: Chronic | ICD-10-CM

## 2024-06-03 DIAGNOSIS — J39.2 OTHER DISEASES OF PHARYNX: ICD-10-CM

## 2024-06-03 PROCEDURE — 78815 PET IMAGE W/CT SKULL-THIGH: CPT | Mod: 26,PI

## 2024-06-03 PROCEDURE — A9552: CPT

## 2024-06-03 PROCEDURE — 78815 PET IMAGE W/CT SKULL-THIGH: CPT

## 2024-06-04 ENCOUNTER — APPOINTMENT (OUTPATIENT)
Dept: HEMATOLOGY ONCOLOGY | Facility: CLINIC | Age: 64
End: 2024-06-04
Payer: COMMERCIAL

## 2024-06-04 DIAGNOSIS — R59.0 LOCALIZED ENLARGED LYMPH NODES: ICD-10-CM

## 2024-06-04 PROCEDURE — 99205 OFFICE O/P NEW HI 60 MIN: CPT

## 2024-06-04 PROCEDURE — G2211 COMPLEX E/M VISIT ADD ON: CPT | Mod: NC

## 2024-06-04 NOTE — REVIEW OF SYSTEMS
[Diarrhea: Grade 0] : Diarrhea: Grade 0 [Negative] : Allergic/Immunologic [FreeTextEntry4] : as above

## 2024-06-04 NOTE — HISTORY OF PRESENT ILLNESS
[Disease: _____________________] : Disease: [unfilled] [T: ___] : T[unfilled] [N: ___] : N[unfilled] [M: ___] : M[unfilled] [AJCC Stage: ____] : AJCC Stage: [unfilled] [de-identified] : 63-year-old male presents for consult to med onc for head and neck sq cell ca  Started with noticing lump rt neck 2 months ago, grew rapidly, went to PCP and then ENT FNA from 5/7 showed pathology consistent with Metastatic nonkeratinizing squamous cell carcinoma. P16 strongly   4/11/24: US FINDINGS: Palpable mass on the right side of the neck lateral to the right submandibular gland corresponds to a 4.2 x 5.6 cm vascular heterogeneous predominately hypoechoic solid mass. Cervical lymph nodes are seen as follows: On the right: Level 3: A 1.8 x 0.8 x 1.7 cm lymph node with a fatty echogenic hilum and normal thin cortex, an adjacent 1.5 x 0.6 1.8 cm lymph node with a fatty echogenic hilum and normal thin cortex, an adjacent 1.8 x 0.6 x 1.6 cm lymph node a fatty echogenic hilum and normal thin cortex, an adjacent 1.1 x 0.9 x 1.2 cm lymph node with a fatty echogenic h and normal thin cortex Level 4: A 1.7 x 0.8 x 0.8 cm lymph node with a fatty echogenic hilum and normal thin cortex On the left: Level 2: A 2.5 x 1.2 x 1.6 cm lymph node with a fatty echogenic hilum and normal thin cortex Level 3: A 1.3 x 0.6 x 0.9 cm lymph node with a fatty echogenic hilum and normal thin cortex, an adjacent 1.8 x 0.4 1.4 cm lymph node with a fatty echogenic hilum and normal thin cortex. The submandibular and parotid glands are symmetric and homogeneous in appearance bilaterally. IMPRESSION: Palpable mass corresponds to a 5 6 cm vascular hypochoic solid mass lateral to the right submandibular gland. Neoplasm is not excluded. Recommend MRI of the neck without and with contrast for further evaluation.  4/25/24-CT: IMPRESSION Mildly lobulated ovoid soft tissue density positioned between the sternocleidomastoid, parotid, submandibular gland and carotid sheath on the right. Differential diagnosis includes parotid neoplasm, schwannoma, paraganglioma, metastasis among other etiologies. Biopsy can be performed as clinically indicated. Right lingual/palatine tonsil soft tissue prominence and protrusion with into the oropharynx measuring 3.1 x 1.9 x 3.6 cm suspicious neoplastic process without definite focal enhancement. Contact with the mucosal surface on the opposite side of the oropharynx. Prominent posterior nasopharyngeal soft tissue without definite focal lesion. Direct visualization is recommended. Biopsy can be performed as indicated. A few bilateral enlarged lymph nodes may reflect metastatic disease. Clinical correlation recommended. Status post left parietal craniotomy as well as a  shunt (done Dec 5. 2013). 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 Suboptimal evaluation of cerebral parenchyma secondary to streak artifact. Clinical correlation and comparison with prior imaging is recommended if available. MRI and/or MRA of the brain with and without contrast can be performed as clinically indicated.    5/9/24-MRI: Large right oropharyngeal/hypopharyngeal mass with right level 2 and level 3 adenoathy, with a dominant 5.5 cm x 4.5 cm right level 2 lymph node.  CT Soft Tissue Neck 5/16/24: IMPRESSION: Mildly lobulated ovoid soft tissue density positioned between the sternocleidomastoid, parotid, submandibular gland and carotid sheath on the right. Differential diagnosis includes parotid neoplasm, schwannoma, paraganglioma, metastasis among other etiologies. Biopsy can be performed as clinically indicated. Right lingual/palatine tonsil soft tissue prominence and protrusion with into the oropharynx measuring 3.1 x 1.9 x 3.6 cm suspicious neoplastic process without definite focal enhancement. Contact with the mucosal surface on the opposite side of the oropharynx. Prominent posterior nasopharyngeal soft tissue without definite focal lesion. Direct visualization is recommended. Biopsy can be performed as indicated. A few bilateral enlarged lymph nodes may reflect metastatic disease. Clinical correlation recommended. Status post left parietal craniotomy. 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 Suboptimal evaluation of cerebral parenchyma secondary to streak artifact. Clinical correlation and comparison with prior imaging is recommended if available. MRI and/or MRA of the brain with and without contrast can be performed as clinically indicated.   He first noted right neck mass about 3 months ago. He reports fluctuates in size. Experiences intermittent dysphonia since before mass was found. Denies dysphagia, dyspnea. Reports increasing congestion. Denies unintentional weight loss, night sweats, or chills. No smoking history. No cancer history in the family.  PET/CT done 6/3/24 IMPRESSION: Abnormal skull-to-thigh FDG-PET/CT scan.  1. FDG-avid soft tissue mass centered in right palatine tonsil, extending into right vallecula, piriform sinus, and abutting the epiglottis corresponds to patient's primary squamous cell carcinoma. 2. FDG-avid right cervical lymphadenopathy corresponds to biopsy-proven metastatic disease. A small FDG-avid right supraclavicular lymph node is suspicious for metastatic disease. Few small FDG-avid left cervical lymph nodes and right axillary node are nonspecific. Further evaluation may be performed with ultrasound and percutaneous needle biopsy, if indicated. 3. Left frontotemporal craniotomy with subadjacent postsurgical changes and aneurysm clips. There is a small photopenic low density area in the left frontal lobe which may represent cerebral infarction. Correlate clinically and with prior dedicated imaging of brain.    [de-identified] : sq cell ca

## 2024-06-04 NOTE — CONSULT LETTER
[Dear  ___] : Dear  [unfilled], [Consult Letter:] : I had the pleasure of evaluating your patient, [unfilled]. [Please see my note below.] : Please see my note below. [Consult Closing:] : Thank you very much for allowing me to participate in the care of this patient.  If you have any questions, please do not hesitate to contact me. [Sincerely,] : Sincerely, [FreeTextEntry2] : Dr. Jin Brush [FreeTextEntry3] : Kae Davis MD

## 2024-06-04 NOTE — PHYSICAL EXAM
[Restricted in physically strenuous activity but ambulatory and able to carry out work of a light or sedentary nature] : Status 1- Restricted in physically strenuous activity but ambulatory and able to carry out work of a light or sedentary nature, e.g., light house work, office work [Normal] : affect appropriate [de-identified] : large rt neck mass

## 2024-06-04 NOTE — ASSESSMENT
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 We answered all questions. we went over schedule and other pertinent details.  Will reach out to pt with a definite plan and schedule He expressed understanding OV with chemo

## 2024-06-04 NOTE — REASON FOR VISIT
[Initial Consultation] : an initial consultation [Home] : at home, [unfilled] , at the time of the visit. [Medical Office: ([facility])___] : at the medical office located in  [Family Member] : family member [Patient] : the patient [Self] : self [FreeTextEntry2] : head and neck cancer

## 2024-06-05 ENCOUNTER — APPOINTMENT (OUTPATIENT)
Dept: OTOLARYNGOLOGY | Facility: CLINIC | Age: 64
End: 2024-06-05
Payer: COMMERCIAL

## 2024-06-05 PROCEDURE — 92610 EVALUATE SWALLOWING FUNCTION: CPT | Mod: GN

## 2024-06-05 NOTE — ASSESSMENT
[FreeTextEntry1] : CLINICAL DYSPHAGIA EVALUATION  Patient Name: Vipin Spann Date of Evaluation: 24 : 1960 Primary Diagnosis: Dysphagia R13.10 Treatment Diagnosis: Oropharyngeal Dysphagia R13.12 Referring Physician: Dr. 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   HISTORY OF PRESENTING ILLNESS: Patient presented accompanied by his wife. Upon probing, the patient denies difficulty swallowing, pain or speech disturbance at this time. They deny coughing or choking with PO intake. He reported he has been recommended for chemo-therapy and radiation therapy. He has an appointment with Radiologist next week.    Brain aneurysm (437.3) (I67.1) Cervical lymphadenopathy (785.6) (R59.0) Elevated PSA (790.93) (R97.20) Hypertension (401.9) (I10) Mass of neck (784.2) (R22.1) Oropharyngeal cancer (146.9) (C10.9) Oropharyngeal mass (478.29) (J39.2) Paroxysmal atrial fibrillation (427.31) (I48.0)    MEDICATIONS: Were reviewed and can be located in the patient's chart Allergies: Were reviewed and can be located in the patient's chart Current Diet: Regular solids and thin fluids  CLINICAL FINDINGS: Oral Peripheral Assessment: Structures: WFLs Symmetry: WFLs Dentition: WFLs Soft Palate: Present and symmetrical movement Labial: WFLs Lingual: WFLs Mandibular: Mildly reduced ROM Buccal: WFLs Secretions: Clear and nonproductive Neck: +right sided cervical mass c/w history  Volitional Swallow: WFL Volitional Cough: Adequate Voice: WFLs Cognition/Communication: WFL. Pt w/ adequate ability to follow commands/instructions, adequate language skills. Motor Speech: WFLs  Consistencies Administered: Patient declined PO trials Puree Solids  Thin fluids  Oral stages: Adequate spoon stripping with oral containment maintained. There was adequate mastication and cohesive bolus formation. There was adequate AP transport. Adequate clearance  Pharyngeal: Initiation: Prompt Hyolaryngeal elevation: Present and adequate Swallows: 1 per bolus Overt signs/symptoms of penetration/aspiration: Not present  IMPRESSIONS:  Patient presents for pre CXRT speech/swallow evaluation and a baseline of the patient's oral-motor function, speech, vocal and swallow function were taken. In anticipation of XRT, patient was educated on potential impacts/sequelae of XRT and it's potential impact on swallow function, including but not limited to xerostomia, swelling or soreness in the throat, worsening globus, mouth sores, odynophagia, reduced movement, etc. Patient is recommended to implement head/neck stretching as well as indirect swallowing exercises while completing XRT.  PROGNOSIS: Good with therapeutic intervention and patient participation.  RECOMMENDATIONS: 1. Regular solids and thin fluids 2. Moisten solids with gravy/sauces/oils as needed, alternate solids and fluids, maintain good oral care, upright position during and after meals, ensure oral cavity is clear from residue 3. Swallow therapy to maximize the swallow function at a rate and intensity of 1x/week for 8-10 weeks 4. Follow up with referring physician as directed.  EDUCATION: The patient and his wife were educated at length regarding the results, recommendations, safety precautions and plan.  Should you have any additional concerns, please contact this center at [phone]  Rashmi Cruz MA, CCC-SLP Senior Speech-Language Pathologist [facility] [place]

## 2024-06-07 ENCOUNTER — NON-APPOINTMENT (OUTPATIENT)
Age: 64
End: 2024-06-07

## 2024-06-07 ENCOUNTER — RESULT REVIEW (OUTPATIENT)
Age: 64
End: 2024-06-07

## 2024-06-11 ENCOUNTER — APPOINTMENT (OUTPATIENT)
Dept: RADIATION ONCOLOGY | Facility: CLINIC | Age: 64
End: 2024-06-11
Payer: COMMERCIAL

## 2024-06-11 VITALS
WEIGHT: 240.3 LBS | SYSTOLIC BLOOD PRESSURE: 125 MMHG | HEART RATE: 80 BPM | BODY MASS INDEX: 33.52 KG/M2 | RESPIRATION RATE: 14 BRPM | DIASTOLIC BLOOD PRESSURE: 72 MMHG | OXYGEN SATURATION: 97 %

## 2024-06-11 DIAGNOSIS — Z78.9 OTHER SPECIFIED HEALTH STATUS: ICD-10-CM

## 2024-06-11 DIAGNOSIS — C44.42 SQUAMOUS CELL CARCINOMA OF SKIN OF SCALP AND NECK: ICD-10-CM

## 2024-06-11 PROCEDURE — 99204 OFFICE O/P NEW MOD 45 MIN: CPT

## 2024-06-11 NOTE — REASON FOR VISIT
[Head and Neck Cancer] : head and neck cancer [Consideration of Curative Therapy] : consideration of curative therapy for [Spouse] : spouse

## 2024-06-11 NOTE — VITALS
[Maximal Pain Intensity: 0/10] : 0/10 [Least Pain Intensity: 0/10] : 0/10 [90: Able to carry normal activity; minor signs or symptoms of disease.] : 90: Able to carry normal activity; minor signs or symptoms of disease.  [ECOG Performance Status: 0 - Fully active, able to carry on all pre-disease performance without restriction] : Performance Status: 0 - Fully active, able to carry on all pre-disease performance without restriction [1 - Distress Level] : Distress Level: 1

## 2024-06-12 ENCOUNTER — OUTPATIENT (OUTPATIENT)
Dept: OUTPATIENT SERVICES | Facility: HOSPITAL | Age: 64
LOS: 1 days | End: 2024-06-12
Payer: COMMERCIAL

## 2024-06-12 ENCOUNTER — APPOINTMENT (OUTPATIENT)
Dept: ULTRASOUND IMAGING | Facility: IMAGING CENTER | Age: 64
End: 2024-06-12
Payer: COMMERCIAL

## 2024-06-12 ENCOUNTER — NON-APPOINTMENT (OUTPATIENT)
Age: 64
End: 2024-06-12

## 2024-06-12 ENCOUNTER — RESULT REVIEW (OUTPATIENT)
Age: 64
End: 2024-06-12

## 2024-06-12 DIAGNOSIS — C10.9 MALIGNANT NEOPLASM OF OROPHARYNX, UNSPECIFIED: ICD-10-CM

## 2024-06-12 DIAGNOSIS — Z98.2 PRESENCE OF CEREBROSPINAL FLUID DRAINAGE DEVICE: Chronic | ICD-10-CM

## 2024-06-12 DIAGNOSIS — Z00.8 ENCOUNTER FOR OTHER GENERAL EXAMINATION: ICD-10-CM

## 2024-06-12 DIAGNOSIS — Z98.890 OTHER SPECIFIED POSTPROCEDURAL STATES: Chronic | ICD-10-CM

## 2024-06-12 PROCEDURE — 88173 CYTOPATH EVAL FNA REPORT: CPT | Mod: 26

## 2024-06-12 PROCEDURE — 10006 FNA BX W/US GDN EA ADDL: CPT

## 2024-06-12 PROCEDURE — 88305 TISSUE EXAM BY PATHOLOGIST: CPT | Mod: 26

## 2024-06-12 PROCEDURE — 10005 FNA BX W/US GDN 1ST LES: CPT

## 2024-06-12 PROCEDURE — 88173 CYTOPATH EVAL FNA REPORT: CPT

## 2024-06-12 PROCEDURE — 88305 TISSUE EXAM BY PATHOLOGIST: CPT

## 2024-06-12 PROCEDURE — 88172 CYTP DX EVAL FNA 1ST EA SITE: CPT

## 2024-06-13 ENCOUNTER — NON-APPOINTMENT (OUTPATIENT)
Age: 64
End: 2024-06-13

## 2024-06-14 ENCOUNTER — OUTPATIENT (OUTPATIENT)
Dept: OUTPATIENT SERVICES | Facility: HOSPITAL | Age: 64
LOS: 1 days | Discharge: HOME | End: 2024-06-14

## 2024-06-14 ENCOUNTER — APPOINTMENT (OUTPATIENT)
Dept: SPEECH THERAPY | Facility: CLINIC | Age: 64
End: 2024-06-14

## 2024-06-14 ENCOUNTER — OUTPATIENT (OUTPATIENT)
Dept: OUTPATIENT SERVICES | Facility: HOSPITAL | Age: 64
LOS: 1 days | Discharge: HOME | End: 2024-06-14
Payer: COMMERCIAL

## 2024-06-14 DIAGNOSIS — Z98.890 OTHER SPECIFIED POSTPROCEDURAL STATES: Chronic | ICD-10-CM

## 2024-06-14 DIAGNOSIS — Z98.2 PRESENCE OF CEREBROSPINAL FLUID DRAINAGE DEVICE: Chronic | ICD-10-CM

## 2024-06-14 LAB — NON-GYNECOLOGICAL CYTOLOGY STUDY: SIGNIFICANT CHANGE UP

## 2024-06-17 NOTE — HISTORY OF PRESENT ILLNESS
[FreeTextEntry1] : 63 yr. 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 S/p biopsy on 5/7/2024.  Fine Needle Aspiration Report - Auth (Verified) specimen(s) Submitted LYMPH NODE, CERVICAL, RIGHT, L3, US GUIDED CORE BIOPSY AND FNA  Final Diagnosis LYMPH NODE, CERVICAL, RIGHT, L3, US GUIDED CORE BIOPSY AND FNA POSITIVE FOR MALIGNANT CELLS. Metastatic nonkeratinizing squamous cell carcinoma.  5/8/2024 MRI Neck: Impression: Large right oropharyngeal/hypopharyngeal mass with right level 2 and level 3 adenopathy, with a dominant 5.5cm x 4.5cm right level 2 lymph node.   6/3/2024 PETCT: IMPRESSION: Abnormal skull-to-thigh FDG-PET/CT scan. 1. FDG-avid soft tissue mass centered in right palatine tonsil, extending into right vallecula, piriform sinus, and abutting the epiglottis corresponds to patient's primary squamous cell carcinoma. 2. FDG-avid right cervical lymphadenopathy corresponds to biopsy-proven metastatic disease. A small FDG-avid right supraclavicular lymph node is suspicious for metastatic disease. Few small FDG-avid left cervical lymph nodes and right axillary node are nonspecific. Further evaluation may be performed with ultrasound and percutaneous needle biopsy, if indicated. 3. Left frontotemporal craniotomy with subadjacent postsurgical changes and aneurysm clips. There is a small photopenic low density area in the left frontal lobe which may represent cerebral infarction. Correlate clinically and with prior dedicated imaging of brain.  Presents today for consultation to discuss treatment with radiation.

## 2024-06-18 ENCOUNTER — RESULT REVIEW (OUTPATIENT)
Age: 64
End: 2024-06-18

## 2024-06-18 ENCOUNTER — APPOINTMENT (OUTPATIENT)
Dept: HEMATOLOGY ONCOLOGY | Facility: CLINIC | Age: 64
End: 2024-06-18

## 2024-06-18 ENCOUNTER — APPOINTMENT (OUTPATIENT)
Dept: THORACIC SURGERY | Facility: CLINIC | Age: 64
End: 2024-06-18
Payer: COMMERCIAL

## 2024-06-18 ENCOUNTER — NON-APPOINTMENT (OUTPATIENT)
Age: 64
End: 2024-06-18

## 2024-06-18 VITALS
SYSTOLIC BLOOD PRESSURE: 127 MMHG | DIASTOLIC BLOOD PRESSURE: 83 MMHG | BODY MASS INDEX: 32.2 KG/M2 | WEIGHT: 230 LBS | RESPIRATION RATE: 17 BRPM | OXYGEN SATURATION: 96 % | HEIGHT: 71 IN | HEART RATE: 82 BPM

## 2024-06-18 DIAGNOSIS — C10.9 MALIGNANT NEOPLASM OF OROPHARYNX, UNSPECIFIED: ICD-10-CM

## 2024-06-18 DIAGNOSIS — R13.10 DYSPHAGIA, UNSPECIFIED: ICD-10-CM

## 2024-06-18 LAB
BASOPHILS # BLD AUTO: 0.05 K/UL — SIGNIFICANT CHANGE UP (ref 0–0.2)
BASOPHILS NFR BLD AUTO: 0.7 % — SIGNIFICANT CHANGE UP (ref 0–2)
EOSINOPHIL # BLD AUTO: 0.12 K/UL — SIGNIFICANT CHANGE UP (ref 0–0.5)
EOSINOPHIL NFR BLD AUTO: 1.7 % — SIGNIFICANT CHANGE UP (ref 0–6)
HCT VFR BLD CALC: 39.8 % — SIGNIFICANT CHANGE UP (ref 39–50)
HGB BLD-MCNC: 14.2 G/DL — SIGNIFICANT CHANGE UP (ref 13–17)
IMM GRANULOCYTES NFR BLD AUTO: 0.3 % — SIGNIFICANT CHANGE UP (ref 0–0.9)
LYMPHOCYTES # BLD AUTO: 1.78 K/UL — SIGNIFICANT CHANGE UP (ref 1–3.3)
LYMPHOCYTES # BLD AUTO: 24.9 % — SIGNIFICANT CHANGE UP (ref 13–44)
MCHC RBC-ENTMCNC: 32.9 PG — SIGNIFICANT CHANGE UP (ref 27–34)
MCHC RBC-ENTMCNC: 35.7 G/DL — SIGNIFICANT CHANGE UP (ref 32–36)
MCV RBC AUTO: 92.1 FL — SIGNIFICANT CHANGE UP (ref 80–100)
MONOCYTES # BLD AUTO: 0.77 K/UL — SIGNIFICANT CHANGE UP (ref 0–0.9)
MONOCYTES NFR BLD AUTO: 10.8 % — SIGNIFICANT CHANGE UP (ref 2–14)
NEUTROPHILS # BLD AUTO: 4.41 K/UL — SIGNIFICANT CHANGE UP (ref 1.8–7.4)
NEUTROPHILS NFR BLD AUTO: 61.6 % — SIGNIFICANT CHANGE UP (ref 43–77)
NRBC # BLD: 0 /100 WBCS — SIGNIFICANT CHANGE UP (ref 0–0)
PLATELET # BLD AUTO: 276 K/UL — SIGNIFICANT CHANGE UP (ref 150–400)
RBC # BLD: 4.32 M/UL — SIGNIFICANT CHANGE UP (ref 4.2–5.8)
RBC # FLD: 12.6 % — SIGNIFICANT CHANGE UP (ref 10.3–14.5)
WBC # BLD: 7.15 K/UL — SIGNIFICANT CHANGE UP (ref 3.8–10.5)
WBC # FLD AUTO: 7.15 K/UL — SIGNIFICANT CHANGE UP (ref 3.8–10.5)

## 2024-06-18 PROCEDURE — 99205 OFFICE O/P NEW HI 60 MIN: CPT

## 2024-06-18 PROCEDURE — 99215 OFFICE O/P EST HI 40 MIN: CPT

## 2024-06-18 NOTE — PHYSICAL EXAM
[Fully active, able to carry on all pre-disease performance without restriction] : Status 0 - Fully active, able to carry on all pre-disease performance without restriction [General Appearance - In No Acute Distress] : in no acute distress [General Appearance - Alert] : alert [General Appearance - Well Nourished] : well nourished [Sclera] : the sclera and conjunctiva were normal [Extraocular Movements] : extraocular movements were intact [Outer Ear] : the ears and nose were normal in appearance [Hearing Threshold Finger Rub Not [place]] : hearing was normal [Both Tympanic Membranes Were Examined] : both tympanic membranes were normal [Neck Appearance] : the appearance of the neck was normal [Neck Cervical Mass (___cm)] : no neck mass was observed [] : no respiratory distress [Respiration, Rhythm And Depth] : normal respiratory rhythm and effort [Auscultation Breath Sounds / Voice Sounds] : lungs were clear to auscultation bilaterally [Exaggerated Use Of Accessory Muscles For Inspiration] : no accessory muscle use [Heart Rate And Rhythm] : heart rate was normal and rhythm regular [Examination Of The Chest] : the chest was normal in appearance [Chest Visual Inspection Thoracic Asymmetry] : no chest asymmetry [Diminished Respiratory Excursion] : normal chest expansion [2+] : left 2+ [Bowel Sounds] : normal bowel sounds [Abdomen Soft] : soft [Abdomen Tenderness] : non-tender [Abdomen Mass (___ Cm)] : no abdominal mass palpated [Cervical Lymph Nodes Enlarged Posterior Bilaterally] : posterior cervical [Cervical Lymph Nodes Enlarged Anterior Bilaterally] : anterior cervical [Supraclavicular Lymph Nodes Enlarged Bilaterally] : supraclavicular [No CVA Tenderness] : no ~M costovertebral angle tenderness [No Spinal Tenderness] : no spinal tenderness [Involuntary Movements] : no involuntary movements were seen [Skin Color & Pigmentation] : normal skin color and pigmentation [No Focal Deficits] : no focal deficits [Oriented To Time, Place, And Person] : oriented to person, place, and time [FreeTextEntry1] : Deferred

## 2024-06-18 NOTE — ASSESSMENT
[FreeTextEntry1] : Mr. SARAH KATHLEEN, 63 year old male,  Never a  smoker, w/ hx of HTN, Stroke (On Plavix, No residual). Now, with newly diagnosed oralpharyngeal cancer plan or Chemo/RT.  Referred by Dr. Quintero for evaluation regarding PEG tube placement.   I have independently reviewed the medical records and imaging at the time of this office consultation, and discussed the following interpretations with the patient: - Discussed Upper endoscopy PEG tube placement for nutritional support during Chemo/RT. Risks, benefits and alternatives explained to patient; All questions answered and patient agrees to proceed with Procedure.  - Cardiac clearance required prior; Hold Plavix 7 days prior.   Recommendations reviewed with patient during this office visit, and all questions answered; Patient instructed on the importance of follow up and verbalizes understanding.  I, DEMETRIS Ann, personally performed the evaluation and management (E/M) services for this new patient. That E/M includes conducting the initial examination, assessing all conditions, and establishing the plan of care. Today, My ACP, Adenike Vee, was here to observe my evaluation and management services for this patient to be followed going forward.

## 2024-06-18 NOTE — HISTORY OF PRESENT ILLNESS
[FreeTextEntry1] : Mr. SARAH KATHLEEN, 63 year old male,  Never a  smoker, w/ hx of HTN, Stroke (On Plavix, No residual). Now, with newly diagnosed oralpharyngeal cancer plan or Chemo/RT.  Referred by Dr. Quintero for evaluation regarding PEG tube placement.   PET/CT on 6/3/2024: THORAX: There is a tiny FDG-avid right supraclavicular lymph node (SUV 3.6; image 93). FDG-avid subcentimeter right axillary lymph node (SUV 4.0; image 102). Symmetric gynecomastia. ABDOMINOPELVIC LYMPH NODES/RETROPERITONEUM: No enlarged or FDG-avid lymph node. ESOPHAGUS/STOMACH/BOWEL/PERITONEUM/MESENTERY: No abnormal FDG activity.  Patient presents today for CTSX consultation. Tolerating PO with no dysphagia or regurgitation. Today, patient denies worsening SOB, chest pain, cough, hemoptysis, fever, chills, night sweats, lightheadedness or dizziness.

## 2024-06-19 ENCOUNTER — NON-APPOINTMENT (OUTPATIENT)
Age: 64
End: 2024-06-19

## 2024-06-19 DIAGNOSIS — C01 MALIGNANT NEOPLASM OF BASE OF TONGUE: ICD-10-CM

## 2024-06-19 LAB
ALBUMIN SERPL ELPH-MCNC: 4.5 G/DL
ALP BLD-CCNC: 78 U/L
ALT SERPL-CCNC: 25 U/L
ANION GAP SERPL CALC-SCNC: 11 MMOL/L
AST SERPL-CCNC: 19 U/L
BILIRUB SERPL-MCNC: 1.3 MG/DL
BUN SERPL-MCNC: 13 MG/DL
CALCIUM SERPL-MCNC: 9.9 MG/DL
CHLORIDE SERPL-SCNC: 104 MMOL/L
CO2 SERPL-SCNC: 26 MMOL/L
CREAT SERPL-MCNC: 1.31 MG/DL
EGFR: 62 ML/MIN/1.73M2
GLUCOSE SERPL-MCNC: 99 MG/DL
HAV IGM SER QL: NONREACTIVE
HBV CORE IGM SER QL: NONREACTIVE
HBV SURFACE AG SER QL: NONREACTIVE
HCV AB SER QL: NONREACTIVE
HCV S/CO RATIO: 0.09 S/CO
MAGNESIUM SERPL-MCNC: 2.3 MG/DL
POTASSIUM SERPL-SCNC: 3.8 MMOL/L
PROT SERPL-MCNC: 8.2 G/DL
SODIUM SERPL-SCNC: 141 MMOL/L
TSH SERPL-ACNC: 3.15 UIU/ML

## 2024-06-19 PROCEDURE — 77263 THER RADIOLOGY TX PLNG CPLX: CPT

## 2024-06-19 PROCEDURE — 77334 RADIATION TREATMENT AID(S): CPT | Mod: 26

## 2024-06-28 PROBLEM — Z00.00 ENCOUNTER FOR PREVENTIVE HEALTH EXAMINATION: Status: ACTIVE | Noted: 2024-06-28

## 2024-07-02 ENCOUNTER — NON-APPOINTMENT (OUTPATIENT)
Age: 64
End: 2024-07-02

## 2024-07-02 DIAGNOSIS — H90.3 SENSORINEURAL HEARING LOSS, BILATERAL: ICD-10-CM

## 2024-07-02 PROCEDURE — 77301 RADIOTHERAPY DOSE PLAN IMRT: CPT | Mod: 26

## 2024-07-02 PROCEDURE — 77338 DESIGN MLC DEVICE FOR IMRT: CPT | Mod: 26

## 2024-07-02 PROCEDURE — 77300 RADIATION THERAPY DOSE PLAN: CPT | Mod: 26

## 2024-07-09 PROCEDURE — 77427 RADIATION TX MANAGEMENT X5: CPT

## 2024-07-09 PROCEDURE — 77387B: CUSTOM | Mod: 26

## 2024-07-10 ENCOUNTER — RESULT REVIEW (OUTPATIENT)
Age: 64
End: 2024-07-10

## 2024-07-10 ENCOUNTER — NON-APPOINTMENT (OUTPATIENT)
Age: 64
End: 2024-07-10

## 2024-07-10 ENCOUNTER — APPOINTMENT (OUTPATIENT)
Dept: CARDIOLOGY | Facility: CLINIC | Age: 64
End: 2024-07-10

## 2024-07-10 ENCOUNTER — APPOINTMENT (OUTPATIENT)
Dept: HEMATOLOGY ONCOLOGY | Facility: CLINIC | Age: 64
End: 2024-07-10

## 2024-07-10 ENCOUNTER — APPOINTMENT (OUTPATIENT)
Dept: INFUSION THERAPY | Facility: HOSPITAL | Age: 64
End: 2024-07-10

## 2024-07-10 VITALS
DIASTOLIC BLOOD PRESSURE: 76 MMHG | SYSTOLIC BLOOD PRESSURE: 128 MMHG | HEART RATE: 63 BPM | OXYGEN SATURATION: 100 % | RESPIRATION RATE: 16 BRPM | TEMPERATURE: 96.98 F | WEIGHT: 235 LBS

## 2024-07-10 LAB
ALBUMIN SERPL ELPH-MCNC: 4.5 G/DL — SIGNIFICANT CHANGE UP (ref 3.3–5)
ALP SERPL-CCNC: 73 U/L — SIGNIFICANT CHANGE UP (ref 40–120)
ALT FLD-CCNC: 23 U/L — SIGNIFICANT CHANGE UP (ref 10–45)
ANION GAP SERPL CALC-SCNC: 13 MMOL/L — SIGNIFICANT CHANGE UP (ref 5–17)
AST SERPL-CCNC: 27 U/L — SIGNIFICANT CHANGE UP (ref 10–40)
BASOPHILS # BLD AUTO: 0.05 K/UL — SIGNIFICANT CHANGE UP (ref 0–0.2)
BASOPHILS NFR BLD AUTO: 0.8 % — SIGNIFICANT CHANGE UP (ref 0–2)
BILIRUB SERPL-MCNC: 0.9 MG/DL — SIGNIFICANT CHANGE UP (ref 0.2–1.2)
BUN SERPL-MCNC: 13 MG/DL — SIGNIFICANT CHANGE UP (ref 7–23)
CALCIUM SERPL-MCNC: 9.7 MG/DL — SIGNIFICANT CHANGE UP (ref 8.4–10.5)
CHLORIDE SERPL-SCNC: 103 MMOL/L — SIGNIFICANT CHANGE UP (ref 96–108)
CO2 SERPL-SCNC: 25 MMOL/L — SIGNIFICANT CHANGE UP (ref 22–31)
CREAT SERPL-MCNC: 1.14 MG/DL — SIGNIFICANT CHANGE UP (ref 0.5–1.3)
EGFR: 73 ML/MIN/1.73M2 — SIGNIFICANT CHANGE UP
EOSINOPHIL # BLD AUTO: 0.17 K/UL — SIGNIFICANT CHANGE UP (ref 0–0.5)
EOSINOPHIL NFR BLD AUTO: 2.8 % — SIGNIFICANT CHANGE UP (ref 0–6)
GLUCOSE SERPL-MCNC: 135 MG/DL — HIGH (ref 70–99)
HCT VFR BLD CALC: 38.6 % — LOW (ref 39–50)
HGB BLD-MCNC: 13.6 G/DL — SIGNIFICANT CHANGE UP (ref 13–17)
IMM GRANULOCYTES NFR BLD AUTO: 0.3 % — SIGNIFICANT CHANGE UP (ref 0–0.9)
LYMPHOCYTES # BLD AUTO: 1.54 K/UL — SIGNIFICANT CHANGE UP (ref 1–3.3)
LYMPHOCYTES # BLD AUTO: 25.2 % — SIGNIFICANT CHANGE UP (ref 13–44)
MAGNESIUM SERPL-MCNC: 2.3 MG/DL — SIGNIFICANT CHANGE UP (ref 1.6–2.6)
MCHC RBC-ENTMCNC: 33 PG — SIGNIFICANT CHANGE UP (ref 27–34)
MCHC RBC-ENTMCNC: 35.2 G/DL — SIGNIFICANT CHANGE UP (ref 32–36)
MCV RBC AUTO: 93.7 FL — SIGNIFICANT CHANGE UP (ref 80–100)
MONOCYTES # BLD AUTO: 0.62 K/UL — SIGNIFICANT CHANGE UP (ref 0–0.9)
MONOCYTES NFR BLD AUTO: 10.1 % — SIGNIFICANT CHANGE UP (ref 2–14)
NEUTROPHILS # BLD AUTO: 3.72 K/UL — SIGNIFICANT CHANGE UP (ref 1.8–7.4)
NEUTROPHILS NFR BLD AUTO: 60.8 % — SIGNIFICANT CHANGE UP (ref 43–77)
NRBC # BLD: 0 /100 WBCS — SIGNIFICANT CHANGE UP (ref 0–0)
PLATELET # BLD AUTO: 235 K/UL — SIGNIFICANT CHANGE UP (ref 150–400)
POTASSIUM SERPL-MCNC: 3.5 MMOL/L — SIGNIFICANT CHANGE UP (ref 3.5–5.3)
POTASSIUM SERPL-SCNC: 3.5 MMOL/L — SIGNIFICANT CHANGE UP (ref 3.5–5.3)
PROT SERPL-MCNC: 8.4 G/DL — HIGH (ref 6–8.3)
RBC # BLD: 4.12 M/UL — LOW (ref 4.2–5.8)
RBC # FLD: 12.1 % — SIGNIFICANT CHANGE UP (ref 10.3–14.5)
SODIUM SERPL-SCNC: 140 MMOL/L — SIGNIFICANT CHANGE UP (ref 135–145)
T4 FREE+ TSH PNL SERPL: 1.61 UIU/ML — SIGNIFICANT CHANGE UP (ref 0.27–4.2)
WBC # BLD: 6.12 K/UL — SIGNIFICANT CHANGE UP (ref 3.8–10.5)
WBC # FLD AUTO: 6.12 K/UL — SIGNIFICANT CHANGE UP (ref 3.8–10.5)

## 2024-07-10 PROCEDURE — 93010 ELECTROCARDIOGRAM REPORT: CPT

## 2024-07-10 PROCEDURE — 77387B: CUSTOM | Mod: 26

## 2024-07-11 DIAGNOSIS — C76.0 MALIGNANT NEOPLASM OF HEAD, FACE AND NECK: ICD-10-CM

## 2024-07-11 DIAGNOSIS — Z51.11 ENCOUNTER FOR ANTINEOPLASTIC CHEMOTHERAPY: ICD-10-CM

## 2024-07-11 DIAGNOSIS — R11.2 NAUSEA WITH VOMITING, UNSPECIFIED: ICD-10-CM

## 2024-07-11 LAB
HBV CORE AB SER-ACNC: SIGNIFICANT CHANGE UP
HBV SURFACE AB SER-ACNC: REACTIVE
HBV SURFACE AG SER-ACNC: SIGNIFICANT CHANGE UP
HCV AB S/CO SERPL IA: 0.1 S/CO — SIGNIFICANT CHANGE UP (ref 0–0.99)
HCV AB SERPL-IMP: SIGNIFICANT CHANGE UP

## 2024-07-11 PROCEDURE — 77387B: CUSTOM | Mod: 26

## 2024-07-12 PROCEDURE — 77387B: CUSTOM | Mod: 26

## 2024-07-13 ENCOUNTER — APPOINTMENT (OUTPATIENT)
Dept: INFUSION THERAPY | Facility: HOSPITAL | Age: 64
End: 2024-07-13

## 2024-07-15 ENCOUNTER — APPOINTMENT (OUTPATIENT)
Dept: OTOLARYNGOLOGY | Facility: CLINIC | Age: 64
End: 2024-07-15
Payer: COMMERCIAL

## 2024-07-15 DIAGNOSIS — E86.0 DEHYDRATION: ICD-10-CM

## 2024-07-15 PROCEDURE — 77014: CPT | Mod: 26

## 2024-07-15 PROCEDURE — 92526 ORAL FUNCTION THERAPY: CPT | Mod: GN

## 2024-07-15 RX ORDER — METOCLOPRAMIDE 10 MG/1
10 TABLET ORAL EVERY 6 HOURS
Qty: 120 | Refills: 2 | Status: ACTIVE | COMMUNITY
Start: 2024-07-09 | End: 1900-01-01

## 2024-07-16 ENCOUNTER — RESULT REVIEW (OUTPATIENT)
Age: 64
End: 2024-07-16

## 2024-07-16 ENCOUNTER — APPOINTMENT (OUTPATIENT)
Dept: INFUSION THERAPY | Facility: HOSPITAL | Age: 64
End: 2024-07-16

## 2024-07-16 ENCOUNTER — APPOINTMENT (OUTPATIENT)
Dept: HEMATOLOGY ONCOLOGY | Facility: CLINIC | Age: 64
End: 2024-07-16

## 2024-07-16 LAB
ALBUMIN SERPL ELPH-MCNC: 4.2 G/DL — SIGNIFICANT CHANGE UP (ref 3.3–5)
ALP SERPL-CCNC: 67 U/L — SIGNIFICANT CHANGE UP (ref 40–120)
ALT FLD-CCNC: 18 U/L — SIGNIFICANT CHANGE UP (ref 10–45)
ANION GAP SERPL CALC-SCNC: 10 MMOL/L — SIGNIFICANT CHANGE UP (ref 5–17)
AST SERPL-CCNC: 20 U/L — SIGNIFICANT CHANGE UP (ref 10–40)
BASOPHILS # BLD AUTO: 0.03 K/UL — SIGNIFICANT CHANGE UP (ref 0–0.2)
BASOPHILS # BLD AUTO: 0.04 K/UL — SIGNIFICANT CHANGE UP (ref 0–0.2)
BASOPHILS NFR BLD AUTO: 0.5 % — SIGNIFICANT CHANGE UP (ref 0–2)
BASOPHILS NFR BLD AUTO: 0.5 % — SIGNIFICANT CHANGE UP (ref 0–2)
BILIRUB SERPL-MCNC: 1.1 MG/DL — SIGNIFICANT CHANGE UP (ref 0.2–1.2)
BUN SERPL-MCNC: 15 MG/DL — SIGNIFICANT CHANGE UP (ref 7–23)
CALCIUM SERPL-MCNC: 9.3 MG/DL — SIGNIFICANT CHANGE UP (ref 8.4–10.5)
CHLORIDE SERPL-SCNC: 101 MMOL/L — SIGNIFICANT CHANGE UP (ref 96–108)
CO2 SERPL-SCNC: 24 MMOL/L — SIGNIFICANT CHANGE UP (ref 22–31)
CREAT SERPL-MCNC: 1 MG/DL — SIGNIFICANT CHANGE UP (ref 0.5–1.3)
EGFR: 86 ML/MIN/1.73M2 — SIGNIFICANT CHANGE UP
EOSINOPHIL # BLD AUTO: 0.19 K/UL — SIGNIFICANT CHANGE UP (ref 0–0.5)
EOSINOPHIL # BLD AUTO: 0.2 K/UL — SIGNIFICANT CHANGE UP (ref 0–0.5)
EOSINOPHIL NFR BLD AUTO: 2.5 % — SIGNIFICANT CHANGE UP (ref 0–6)
EOSINOPHIL NFR BLD AUTO: 3.3 % — SIGNIFICANT CHANGE UP (ref 0–6)
GLUCOSE SERPL-MCNC: 124 MG/DL — HIGH (ref 70–99)
HCT VFR BLD CALC: 39.2 % — SIGNIFICANT CHANGE UP (ref 39–50)
HCT VFR BLD CALC: 39.5 % — SIGNIFICANT CHANGE UP (ref 39–50)
HGB BLD-MCNC: 13.7 G/DL — SIGNIFICANT CHANGE UP (ref 13–17)
HGB BLD-MCNC: 13.7 G/DL — SIGNIFICANT CHANGE UP (ref 13–17)
IMM GRANULOCYTES NFR BLD AUTO: 0.9 % — SIGNIFICANT CHANGE UP (ref 0–0.9)
IMM GRANULOCYTES NFR BLD AUTO: 1 % — HIGH (ref 0–0.9)
LYMPHOCYTES # BLD AUTO: 0.69 K/UL — LOW (ref 1–3.3)
LYMPHOCYTES # BLD AUTO: 0.98 K/UL — LOW (ref 1–3.3)
LYMPHOCYTES # BLD AUTO: 11.3 % — LOW (ref 13–44)
LYMPHOCYTES # BLD AUTO: 13.1 % — SIGNIFICANT CHANGE UP (ref 13–44)
MAGNESIUM SERPL-MCNC: 2.3 MG/DL — SIGNIFICANT CHANGE UP (ref 1.6–2.6)
MCHC RBC-ENTMCNC: 32.6 PG — SIGNIFICANT CHANGE UP (ref 27–34)
MCHC RBC-ENTMCNC: 32.7 PG — SIGNIFICANT CHANGE UP (ref 27–34)
MCHC RBC-ENTMCNC: 34.7 G/DL — SIGNIFICANT CHANGE UP (ref 32–36)
MCHC RBC-ENTMCNC: 34.9 G/DL — SIGNIFICANT CHANGE UP (ref 32–36)
MCV RBC AUTO: 93.6 FL — SIGNIFICANT CHANGE UP (ref 80–100)
MCV RBC AUTO: 94 FL — SIGNIFICANT CHANGE UP (ref 80–100)
MONOCYTES # BLD AUTO: 0.68 K/UL — SIGNIFICANT CHANGE UP (ref 0–0.9)
MONOCYTES # BLD AUTO: 0.96 K/UL — HIGH (ref 0–0.9)
MONOCYTES NFR BLD AUTO: 11.1 % — SIGNIFICANT CHANGE UP (ref 2–14)
MONOCYTES NFR BLD AUTO: 12.8 % — SIGNIFICANT CHANGE UP (ref 2–14)
NEUTROPHILS # BLD AUTO: 4.46 K/UL — SIGNIFICANT CHANGE UP (ref 1.8–7.4)
NEUTROPHILS # BLD AUTO: 5.24 K/UL — SIGNIFICANT CHANGE UP (ref 1.8–7.4)
NEUTROPHILS NFR BLD AUTO: 70.2 % — SIGNIFICANT CHANGE UP (ref 43–77)
NEUTROPHILS NFR BLD AUTO: 72.8 % — SIGNIFICANT CHANGE UP (ref 43–77)
NRBC # BLD: 0 /100 WBCS — SIGNIFICANT CHANGE UP (ref 0–0)
NRBC # BLD: 0 /100 WBCS — SIGNIFICANT CHANGE UP (ref 0–0)
PLATELET # BLD AUTO: 247 K/UL — SIGNIFICANT CHANGE UP (ref 150–400)
PLATELET # BLD AUTO: 248 K/UL — SIGNIFICANT CHANGE UP (ref 150–400)
POTASSIUM SERPL-MCNC: 3.6 MMOL/L — SIGNIFICANT CHANGE UP (ref 3.5–5.3)
POTASSIUM SERPL-SCNC: 3.6 MMOL/L — SIGNIFICANT CHANGE UP (ref 3.5–5.3)
PROT SERPL-MCNC: 7.8 G/DL — SIGNIFICANT CHANGE UP (ref 6–8.3)
RBC # BLD: 4.19 M/UL — LOW (ref 4.2–5.8)
RBC # BLD: 4.2 M/UL — SIGNIFICANT CHANGE UP (ref 4.2–5.8)
RBC # FLD: 12.4 % — SIGNIFICANT CHANGE UP (ref 10.3–14.5)
RBC # FLD: 12.6 % — SIGNIFICANT CHANGE UP (ref 10.3–14.5)
SODIUM SERPL-SCNC: 136 MMOL/L — SIGNIFICANT CHANGE UP (ref 135–145)
WBC # BLD: 6.12 K/UL — SIGNIFICANT CHANGE UP (ref 3.8–10.5)
WBC # BLD: 7.48 K/UL — SIGNIFICANT CHANGE UP (ref 3.8–10.5)
WBC # FLD AUTO: 6.12 K/UL — SIGNIFICANT CHANGE UP (ref 3.8–10.5)
WBC # FLD AUTO: 7.48 K/UL — SIGNIFICANT CHANGE UP (ref 3.8–10.5)

## 2024-07-16 PROCEDURE — G2211 COMPLEX E/M VISIT ADD ON: CPT | Mod: NC

## 2024-07-16 PROCEDURE — 99213 OFFICE O/P EST LOW 20 MIN: CPT

## 2024-07-16 PROCEDURE — 77387B: CUSTOM | Mod: 26

## 2024-07-16 PROCEDURE — 77427 RADIATION TX MANAGEMENT X5: CPT

## 2024-07-17 ENCOUNTER — NON-APPOINTMENT (OUTPATIENT)
Age: 64
End: 2024-07-17

## 2024-07-17 VITALS
RESPIRATION RATE: 16 BRPM | DIASTOLIC BLOOD PRESSURE: 76 MMHG | HEART RATE: 78 BPM | OXYGEN SATURATION: 96 % | BODY MASS INDEX: 32.9 KG/M2 | WEIGHT: 235 LBS | SYSTOLIC BLOOD PRESSURE: 119 MMHG | TEMPERATURE: 97.6 F | HEIGHT: 71 IN

## 2024-07-17 LAB — T4 FREE+ TSH PNL SERPL: 0.89 UIU/ML — SIGNIFICANT CHANGE UP (ref 0.27–4.2)

## 2024-07-17 PROCEDURE — 77387B: CUSTOM | Mod: 26

## 2024-07-18 PROCEDURE — 77387B: CUSTOM | Mod: 26

## 2024-07-19 ENCOUNTER — APPOINTMENT (OUTPATIENT)
Dept: HEMATOLOGY ONCOLOGY | Facility: CLINIC | Age: 64
End: 2024-07-19
Payer: COMMERCIAL

## 2024-07-19 ENCOUNTER — APPOINTMENT (OUTPATIENT)
Dept: INFUSION THERAPY | Facility: HOSPITAL | Age: 64
End: 2024-07-19

## 2024-07-19 DIAGNOSIS — C44.42 SQUAMOUS CELL CARCINOMA OF SKIN OF SCALP AND NECK: ICD-10-CM

## 2024-07-19 PROCEDURE — 99214 OFFICE O/P EST MOD 30 MIN: CPT

## 2024-07-19 PROCEDURE — 77387B: CUSTOM | Mod: 26

## 2024-07-19 PROCEDURE — G2211 COMPLEX E/M VISIT ADD ON: CPT | Mod: NC

## 2024-07-22 ENCOUNTER — APPOINTMENT (OUTPATIENT)
Dept: THORACIC SURGERY | Facility: HOSPITAL | Age: 64
End: 2024-07-22

## 2024-07-22 PROCEDURE — 77014: CPT | Mod: 26

## 2024-07-23 ENCOUNTER — APPOINTMENT (OUTPATIENT)
Dept: HEMATOLOGY ONCOLOGY | Facility: CLINIC | Age: 64
End: 2024-07-23
Payer: COMMERCIAL

## 2024-07-23 ENCOUNTER — RESULT REVIEW (OUTPATIENT)
Age: 64
End: 2024-07-23

## 2024-07-23 ENCOUNTER — APPOINTMENT (OUTPATIENT)
Dept: INFUSION THERAPY | Facility: HOSPITAL | Age: 64
End: 2024-07-23

## 2024-07-23 LAB
ALBUMIN SERPL ELPH-MCNC: 4.3 G/DL — SIGNIFICANT CHANGE UP (ref 3.3–5)
ALP SERPL-CCNC: 71 U/L — SIGNIFICANT CHANGE UP (ref 40–120)
ALT FLD-CCNC: 18 U/L — SIGNIFICANT CHANGE UP (ref 10–45)
ANION GAP SERPL CALC-SCNC: 11 MMOL/L — SIGNIFICANT CHANGE UP (ref 5–17)
AST SERPL-CCNC: 18 U/L — SIGNIFICANT CHANGE UP (ref 10–40)
BASOPHILS # BLD AUTO: 0.03 K/UL — SIGNIFICANT CHANGE UP (ref 0–0.2)
BASOPHILS # BLD AUTO: 0.03 K/UL — SIGNIFICANT CHANGE UP (ref 0–0.2)
BASOPHILS NFR BLD AUTO: 0.3 % — SIGNIFICANT CHANGE UP (ref 0–2)
BASOPHILS NFR BLD AUTO: 0.4 % — SIGNIFICANT CHANGE UP (ref 0–2)
BILIRUB SERPL-MCNC: 1.2 MG/DL — SIGNIFICANT CHANGE UP (ref 0.2–1.2)
BUN SERPL-MCNC: 16 MG/DL — SIGNIFICANT CHANGE UP (ref 7–23)
CALCIUM SERPL-MCNC: 10.2 MG/DL — SIGNIFICANT CHANGE UP (ref 8.4–10.5)
CHLORIDE SERPL-SCNC: 102 MMOL/L — SIGNIFICANT CHANGE UP (ref 96–108)
CO2 SERPL-SCNC: 28 MMOL/L — SIGNIFICANT CHANGE UP (ref 22–31)
CREAT SERPL-MCNC: 1.26 MG/DL — SIGNIFICANT CHANGE UP (ref 0.5–1.3)
EGFR: 65 ML/MIN/1.73M2 — SIGNIFICANT CHANGE UP
EOSINOPHIL # BLD AUTO: 0.15 K/UL — SIGNIFICANT CHANGE UP (ref 0–0.5)
EOSINOPHIL # BLD AUTO: 0.15 K/UL — SIGNIFICANT CHANGE UP (ref 0–0.5)
EOSINOPHIL NFR BLD AUTO: 1.7 % — SIGNIFICANT CHANGE UP (ref 0–6)
EOSINOPHIL NFR BLD AUTO: 1.8 % — SIGNIFICANT CHANGE UP (ref 0–6)
GLUCOSE SERPL-MCNC: 88 MG/DL — SIGNIFICANT CHANGE UP (ref 70–99)
HCT VFR BLD CALC: 38.4 % — LOW (ref 39–50)
HCT VFR BLD CALC: 41 % — SIGNIFICANT CHANGE UP (ref 39–50)
HGB BLD-MCNC: 13.4 G/DL — SIGNIFICANT CHANGE UP (ref 13–17)
HGB BLD-MCNC: 13.9 G/DL — SIGNIFICANT CHANGE UP (ref 13–17)
IMM GRANULOCYTES NFR BLD AUTO: 0.6 % — SIGNIFICANT CHANGE UP (ref 0–0.9)
IMM GRANULOCYTES NFR BLD AUTO: 1 % — HIGH (ref 0–0.9)
LYMPHOCYTES # BLD AUTO: 0.57 K/UL — LOW (ref 1–3.3)
LYMPHOCYTES # BLD AUTO: 0.65 K/UL — LOW (ref 1–3.3)
LYMPHOCYTES # BLD AUTO: 6.9 % — LOW (ref 13–44)
LYMPHOCYTES # BLD AUTO: 7.3 % — LOW (ref 13–44)
MAGNESIUM SERPL-MCNC: 2.5 MG/DL — SIGNIFICANT CHANGE UP (ref 1.6–2.6)
MCHC RBC-ENTMCNC: 32.8 PG — SIGNIFICANT CHANGE UP (ref 27–34)
MCHC RBC-ENTMCNC: 32.9 PG — SIGNIFICANT CHANGE UP (ref 27–34)
MCHC RBC-ENTMCNC: 33.9 G/DL — SIGNIFICANT CHANGE UP (ref 32–36)
MCHC RBC-ENTMCNC: 34.9 G/DL — SIGNIFICANT CHANGE UP (ref 32–36)
MCV RBC AUTO: 93.9 FL — SIGNIFICANT CHANGE UP (ref 80–100)
MCV RBC AUTO: 96.9 FL — SIGNIFICANT CHANGE UP (ref 80–100)
MONOCYTES # BLD AUTO: 0.81 K/UL — SIGNIFICANT CHANGE UP (ref 0–0.9)
MONOCYTES # BLD AUTO: 0.83 K/UL — SIGNIFICANT CHANGE UP (ref 0–0.9)
MONOCYTES NFR BLD AUTO: 9.4 % — SIGNIFICANT CHANGE UP (ref 2–14)
MONOCYTES NFR BLD AUTO: 9.8 % — SIGNIFICANT CHANGE UP (ref 2–14)
NEUTROPHILS # BLD AUTO: 6.66 K/UL — SIGNIFICANT CHANGE UP (ref 1.8–7.4)
NEUTROPHILS # BLD AUTO: 7.1 K/UL — SIGNIFICANT CHANGE UP (ref 1.8–7.4)
NEUTROPHILS NFR BLD AUTO: 80.3 % — HIGH (ref 43–77)
NEUTROPHILS NFR BLD AUTO: 80.5 % — HIGH (ref 43–77)
NRBC # BLD: 0 /100 WBCS — SIGNIFICANT CHANGE UP (ref 0–0)
NRBC # BLD: 0 /100 WBCS — SIGNIFICANT CHANGE UP (ref 0–0)
PLATELET # BLD AUTO: 234 K/UL — SIGNIFICANT CHANGE UP (ref 150–400)
PLATELET # BLD AUTO: 257 K/UL — SIGNIFICANT CHANGE UP (ref 150–400)
POTASSIUM SERPL-MCNC: 4.2 MMOL/L — SIGNIFICANT CHANGE UP (ref 3.5–5.3)
POTASSIUM SERPL-SCNC: 4.2 MMOL/L — SIGNIFICANT CHANGE UP (ref 3.5–5.3)
PROT SERPL-MCNC: 8 G/DL — SIGNIFICANT CHANGE UP (ref 6–8.3)
RBC # BLD: 4.09 M/UL — LOW (ref 4.2–5.8)
RBC # BLD: 4.23 M/UL — SIGNIFICANT CHANGE UP (ref 4.2–5.8)
RBC # FLD: 12.8 % — SIGNIFICANT CHANGE UP (ref 10.3–14.5)
RBC # FLD: 12.9 % — SIGNIFICANT CHANGE UP (ref 10.3–14.5)
SODIUM SERPL-SCNC: 140 MMOL/L — SIGNIFICANT CHANGE UP (ref 135–145)
T4 FREE+ TSH PNL SERPL: 0.81 UIU/ML — SIGNIFICANT CHANGE UP (ref 0.27–4.2)
WBC # BLD: 8.27 K/UL — SIGNIFICANT CHANGE UP (ref 3.8–10.5)
WBC # BLD: 8.85 K/UL — SIGNIFICANT CHANGE UP (ref 3.8–10.5)
WBC # FLD AUTO: 8.27 K/UL — SIGNIFICANT CHANGE UP (ref 3.8–10.5)
WBC # FLD AUTO: 8.85 K/UL — SIGNIFICANT CHANGE UP (ref 3.8–10.5)

## 2024-07-23 PROCEDURE — 99214 OFFICE O/P EST MOD 30 MIN: CPT

## 2024-07-23 PROCEDURE — 77387B: CUSTOM | Mod: 26

## 2024-07-23 PROCEDURE — 77427 RADIATION TX MANAGEMENT X5: CPT

## 2024-07-23 NOTE — HISTORY OF PRESENT ILLNESS
[Disease: _____________________] : Disease: [unfilled] [de-identified] : 63-year-old male presents for consult to med onc for head and neck sq cell ca  Started with noticing lump rt neck 2 months ago, grew rapidly, went to PCP and then ENT FNA from 5/7 showed pathology consistent with Metastatic nonkeratinizing squamous cell carcinoma. P16 strongly  4/11/24: US FINDINGS: Palpable mass on the right side of the neck lateral to the right submandibular gland corresponds to a 4.2 x 5.6 cm vascular heterogeneous predominately hypoechoic solid mass. Cervical lymph nodes are seen as follows: On the right: Level 3: A 1.8 x 0.8 x 1.7 cm lymph node with a fatty echogenic hilum and normal thin cortex, an adjacent 1.5 x 0.6 1.8 cm lymph node with a fatty echogenic hilum and normal thin cortex, an adjacent 1.8 x 0.6 x 1.6 cm lymph node a fatty echogenic hilum and normal thin cortex, an adjacent 1.1 x 0.9 x 1.2 cm lymph node with a fatty echogenic h and normal thin cortex Level 4: A 1.7 x 0.8 x 0.8 cm lymph node with a fatty echogenic hilum and normal thin cortex On the left: Level 2: A 2.5 x 1.2 x 1.6 cm lymph node with a fatty echogenic hilum and normal thin cortex Level 3: A 1.3 x 0.6 x 0.9 cm lymph node with a fatty echogenic hilum and normal thin cortex, an adjacent 1.8 x 0.4 1.4 cm lymph node with a fatty echogenic hilum and normal thin cortex. The submandibular and parotid glands are symmetric and homogeneous in appearance bilaterally. IMPRESSION: Palpable mass corresponds to a 5 6 cm vascular hypochoic solid mass lateral to the right submandibular gland. Neoplasm is not excluded. Recommend MRI of the neck without and with contrast for further evaluation.  4/25/24-CT: IMPRESSION Mildly lobulated ovoid soft tissue density positioned between the sternocleidomastoid, parotid, submandibular gland and carotid sheath on the right. Differential diagnosis includes parotid neoplasm, schwannoma, paraganglioma, metastasis among other etiologies. Biopsy can be performed as clinically indicated. Right lingual/palatine tonsil soft tissue prominence and protrusion with into the oropharynx measuring 3.1 x 1.9 x 3.6 cm suspicious neoplastic process without definite focal enhancement. Contact with the mucosal surface on the opposite side of the oropharynx. Prominent posterior nasopharyngeal soft tissue without definite focal lesion. Direct visualization is recommended. Biopsy can be performed as indicated. A few bilateral enlarged lymph nodes may reflect metastatic disease. Clinical correlation recommended. Status post left parietal craniotomy as well as a  shunt (done Dec 5. 2013). 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 Suboptimal evaluation of cerebral parenchyma secondary to streak artifact. Clinical correlation and comparison with prior imaging is recommended if available. MRI and/or MRA of the brain with and without contrast can be performed as clinically indicated.    5/9/24-MRI: Large right oropharyngeal/hypopharyngeal mass with right level 2 and level 3 adenoathy, with a dominant 5.5 cm x 4.5 cm right level 2 lymph node.  CT Soft Tissue Neck 5/16/24: IMPRESSION: Mildly lobulated ovoid soft tissue density positioned between the sternocleidomastoid, parotid, submandibular gland and carotid sheath on the right. Differential diagnosis includes parotid neoplasm, schwannoma, paraganglioma, metastasis among other etiologies. Biopsy can be performed as clinically indicated. Right lingual/palatine tonsil soft tissue prominence and protrusion with into the oropharynx measuring 3.1 x 1.9 x 3.6 cm suspicious neoplastic process without definite focal enhancement. Contact with the mucosal surface on the opposite side of the oropharynx. Prominent posterior nasopharyngeal soft tissue without definite focal lesion. Direct visualization is recommended. Biopsy can be performed as indicated. A few bilateral enlarged lymph nodes may reflect metastatic disease. Clinical correlation recommended. Status post left parietal craniotomy. 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 Suboptimal evaluation of cerebral parenchyma secondary to streak artifact. Clinical correlation and comparison with prior imaging is recommended if available. MRI and/or MRA of the brain with and without contrast can be performed as clinically indicated.   He first noted right neck mass about 3 months ago. He reports fluctuates in size. Experiences intermittent dysphonia since before mass was found. Denies dysphagia, dyspnea. Reports increasing congestion. Denies unintentional weight loss, night sweats, or chills. No smoking history. No cancer history in the family.  PET/CT done 6/3/24 IMPRESSION: Abnormal skull-to-thigh FDG-PET/CT scan. 1. FDG-avid soft tissue mass centered in right palatine tonsil, extending into right vallecula, piriform sinus, and abutting the epiglottis corresponds to patient's primary squamous cell carcinoma. 2. FDG-avid right cervical lymphadenopathy corresponds to biopsy-proven metastatic disease. A small FDG-avid right supraclavicular lymph node is suspicious for metastatic disease. Few small FDG-avid left cervical lymph nodes and right axillary node are nonspecific. Further evaluation may be performed with ultrasound and percutaneous needle biopsy, if indicated. 3. Left frontotemporal craniotomy with subadjacent postsurgical changes and aneurysm clips. There is a small photopenic low density area in the left frontal lobe which may represent cerebral infarction. Correlate clinically and with prior dedicated imaging of brain.  Interval history  7/19/24: Pt states he feels well. He notes that the neck mass is smaller. He denies any dysphagia/odynophagia/weight loss.   7/23/24: Patient seen today for follow up while receiving C3 of carbo with concurrent RT. He reports feeling overall well. Feels the mass on his neck is smaller and softer. Denies N/V/D/C, fever, chills, odynophagia.      Disease: head and neck cancer   Pathology: sq cell ca   TNM stage: T4, N3, Mx AJCC Stage: III/IV   [de-identified] : sq cell ca

## 2024-07-23 NOTE — PHYSICAL EXAM
[Fully active, able to carry on all pre-disease performance without restriction] : Status 0 - Fully active, able to carry on all pre-disease performance without restriction [Normal] : affect appropriate [de-identified] : rt neck mass remarkably smaller

## 2024-07-23 NOTE — ASSESSMENT
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 Therefore, pt was started on chemo and RT.  Carboplatin was chosen because of baseline elevated creatinine at 1.3. He started this chemo on July 10th 2024. Creatinine has improved to normal levels now. Pt has been receiving IV hydration weekly and this may been instrumental in normalizing renal function.   Plan:  -Continue carboplatin  -Continue RT. Plan for a total of 35 frx to oropharynx/neck. 7ompleted 11/35 frx thus far -We reiterated the importance of timely reporting of AEs -Nutrition follow up OV with each chemo

## 2024-07-24 ENCOUNTER — NON-APPOINTMENT (OUTPATIENT)
Age: 64
End: 2024-07-24

## 2024-07-24 VITALS
OXYGEN SATURATION: 97 % | WEIGHT: 235 LBS | HEIGHT: 71 IN | SYSTOLIC BLOOD PRESSURE: 122 MMHG | DIASTOLIC BLOOD PRESSURE: 70 MMHG | RESPIRATION RATE: 16 BRPM | HEART RATE: 70 BPM | TEMPERATURE: 97.4 F | BODY MASS INDEX: 32.9 KG/M2

## 2024-07-24 PROCEDURE — 77387B: CUSTOM | Mod: 26

## 2024-07-24 NOTE — REVIEW OF SYSTEMS
[Dysphagia: Grade 0] : Dysphagia: Grade 0 [Fatigue: Grade 0] : Fatigue: Grade 0 [Oral Pain: Grade 0] : Oral Pain: Grade 0 [Dysgeusia: Grade 0] : Dysgeusia: Grade 0 [Skin Hyperpigmentation: Grade 0] : Skin Hyperpigmentation: Grade 0 [Dermatitis Radiation: Grade 0] : Dermatitis Radiation: Grade 0 [FreeTextEntry5] : + neck mass RIGHT [de-identified] : tolerating regular consistency foods

## 2024-07-24 NOTE — HISTORY OF PRESENT ILLNESS
[FreeTextEntry1] : 63 yr. 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 S/p biopsy on 5/7/2024.  Fine Needle Aspiration Report - Auth (Verified) specimen(s) Submitted LYMPH NODE, CERVICAL, RIGHT, L3, US GUIDED CORE BIOPSY AND FNA  Final Diagnosis LYMPH NODE, CERVICAL, RIGHT, L3, US GUIDED CORE BIOPSY AND FNA POSITIVE FOR MALIGNANT CELLS. Metastatic nonkeratinizing squamous cell carcinoma.  5/8/2024 MRI Neck: Impression: Large right oropharyngeal/hypopharyngeal mass with right level 2 and level 3 adenopathy, with a dominant 5.5cm x 4.5cm right level 2 lymph node.   6/3/2024 PETCT: IMPRESSION: Abnormal skull-to-thigh FDG-PET/CT scan. 1. FDG-avid soft tissue mass centered in right palatine tonsil, extending into right vallecula, piriform sinus, and abutting the epiglottis corresponds to patient's primary squamous cell carcinoma. 2. FDG-avid right cervical lymphadenopathy corresponds to biopsy-proven metastatic disease. A small FDG-avid right supraclavicular lymph node is suspicious for metastatic disease. Few small FDG-avid left cervical lymph nodes and right axillary node are nonspecific. Further evaluation may be performed with ultrasound and percutaneous needle biopsy, if indicated. 3. Left frontotemporal craniotomy with subadjacent postsurgical changes and aneurysm clips. There is a small photopenic low density area in the left frontal lobe which may represent cerebral infarction. Correlate clinically and with prior dedicated imaging of brain.  7/10/2024 Presents today for OTV. Completed 2/35 Fxs  to oropharynx/neck thus far doing well. Today w/o any verbalized concerns. Denies difficulty with swallowing eating regular foods w/o difficulty.  7/17/2024 OTV Completed fx 7/35. He states that he is doing well without symptoms at this time. He will being moisturizing the skin. He is tolerating PO intake without issues. No pain.  7/24/2024: Patient presents to clinic for OTV s/p 12/35fx of 7000 cGy to the oropharynx/neck. Feeling well. Denies pain, trouble swallowing, Weight stable.

## 2024-07-24 NOTE — DISEASE MANAGEMENT
[Clinical] : TNM Stage: c [ESTEE] : ESTEE [TTNM] : 4 [NTNM] : 1 [MTNM] : 0 [de-identified] : [id number] [de-identified] : Oropharynx/neck [de-identified] : [id number]

## 2024-07-25 ENCOUNTER — APPOINTMENT (OUTPATIENT)
Dept: OTOLARYNGOLOGY | Facility: CLINIC | Age: 64
End: 2024-07-25
Payer: COMMERCIAL

## 2024-07-25 PROCEDURE — 77387B: CUSTOM | Mod: 26

## 2024-07-25 PROCEDURE — 92526 ORAL FUNCTION THERAPY: CPT | Mod: GN

## 2024-07-25 RX ORDER — DIPHENHYDRAMINE HYDROCHLORIDE AND LIDOCAINE HYDROCHLORIDE AND ALUMINUM HYDROXIDE AND MAGNESIUM HYDRO
KIT
Qty: 900 | Refills: 3 | Status: ACTIVE | COMMUNITY
Start: 2024-07-25 | End: 1900-01-01

## 2024-07-26 ENCOUNTER — APPOINTMENT (OUTPATIENT)
Dept: INFUSION THERAPY | Facility: HOSPITAL | Age: 64
End: 2024-07-26

## 2024-07-26 ENCOUNTER — APPOINTMENT (OUTPATIENT)
Dept: HEMATOLOGY ONCOLOGY | Facility: CLINIC | Age: 64
End: 2024-07-26

## 2024-07-26 PROCEDURE — 77387B: CUSTOM | Mod: 26

## 2024-07-27 ENCOUNTER — OUTPATIENT (OUTPATIENT)
Dept: OUTPATIENT SERVICES | Facility: HOSPITAL | Age: 64
LOS: 1 days | Discharge: HOME | End: 2024-07-27

## 2024-07-27 DIAGNOSIS — Z98.890 OTHER SPECIFIED POSTPROCEDURAL STATES: Chronic | ICD-10-CM

## 2024-07-27 DIAGNOSIS — C44.92 SQUAMOUS CELL CARCINOMA OF SKIN, UNSPECIFIED: ICD-10-CM

## 2024-07-27 DIAGNOSIS — Z98.2 PRESENCE OF CEREBROSPINAL FLUID DRAINAGE DEVICE: Chronic | ICD-10-CM

## 2024-07-29 ENCOUNTER — APPOINTMENT (OUTPATIENT)
Dept: OTOLARYNGOLOGY | Facility: CLINIC | Age: 64
End: 2024-07-29
Payer: COMMERCIAL

## 2024-07-29 PROCEDURE — 92526 ORAL FUNCTION THERAPY: CPT | Mod: GN

## 2024-07-29 PROCEDURE — 77014: CPT | Mod: 26

## 2024-07-30 ENCOUNTER — RESULT REVIEW (OUTPATIENT)
Age: 64
End: 2024-07-30

## 2024-07-30 ENCOUNTER — APPOINTMENT (OUTPATIENT)
Dept: HEMATOLOGY ONCOLOGY | Facility: CLINIC | Age: 64
End: 2024-07-30

## 2024-07-30 ENCOUNTER — APPOINTMENT (OUTPATIENT)
Dept: INFUSION THERAPY | Facility: HOSPITAL | Age: 64
End: 2024-07-30

## 2024-07-30 ENCOUNTER — NON-APPOINTMENT (OUTPATIENT)
Age: 64
End: 2024-07-30

## 2024-07-30 DIAGNOSIS — Z92.3 PERSONAL HISTORY OF IRRADIATION: ICD-10-CM

## 2024-07-30 LAB
ALBUMIN SERPL ELPH-MCNC: 3.9 G/DL — SIGNIFICANT CHANGE UP (ref 3.3–5)
ALP SERPL-CCNC: 67 U/L — SIGNIFICANT CHANGE UP (ref 40–120)
ALT FLD-CCNC: 18 U/L — SIGNIFICANT CHANGE UP (ref 10–45)
ANION GAP SERPL CALC-SCNC: 11 MMOL/L — SIGNIFICANT CHANGE UP (ref 5–17)
AST SERPL-CCNC: 26 U/L — SIGNIFICANT CHANGE UP (ref 10–40)
BASOPHILS # BLD AUTO: 0.03 K/UL — SIGNIFICANT CHANGE UP (ref 0–0.2)
BASOPHILS NFR BLD AUTO: 0.5 % — SIGNIFICANT CHANGE UP (ref 0–2)
BILIRUB SERPL-MCNC: 0.8 MG/DL — SIGNIFICANT CHANGE UP (ref 0.2–1.2)
BUN SERPL-MCNC: 19 MG/DL — SIGNIFICANT CHANGE UP (ref 7–23)
CALCIUM SERPL-MCNC: 9.3 MG/DL — SIGNIFICANT CHANGE UP (ref 8.4–10.5)
CHLORIDE SERPL-SCNC: 107 MMOL/L — SIGNIFICANT CHANGE UP (ref 96–108)
CO2 SERPL-SCNC: 24 MMOL/L — SIGNIFICANT CHANGE UP (ref 22–31)
CREAT SERPL-MCNC: 1.25 MG/DL — SIGNIFICANT CHANGE UP (ref 0.5–1.3)
EGFR: 66 ML/MIN/1.73M2 — SIGNIFICANT CHANGE UP
EOSINOPHIL # BLD AUTO: 0.05 K/UL — SIGNIFICANT CHANGE UP (ref 0–0.5)
EOSINOPHIL NFR BLD AUTO: 0.8 % — SIGNIFICANT CHANGE UP (ref 0–6)
GLUCOSE SERPL-MCNC: 78 MG/DL — SIGNIFICANT CHANGE UP (ref 70–99)
HCT VFR BLD CALC: 35.6 % — LOW (ref 39–50)
HGB BLD-MCNC: 12.8 G/DL — LOW (ref 13–17)
IMM GRANULOCYTES NFR BLD AUTO: 0.6 % — SIGNIFICANT CHANGE UP (ref 0–0.9)
LYMPHOCYTES # BLD AUTO: 0.3 K/UL — LOW (ref 1–3.3)
LYMPHOCYTES # BLD AUTO: 4.5 % — LOW (ref 13–44)
MAGNESIUM SERPL-MCNC: 2.4 MG/DL — SIGNIFICANT CHANGE UP (ref 1.6–2.6)
MCHC RBC-ENTMCNC: 33.9 PG — SIGNIFICANT CHANGE UP (ref 27–34)
MCHC RBC-ENTMCNC: 36 G/DL — SIGNIFICANT CHANGE UP (ref 32–36)
MCV RBC AUTO: 94.2 FL — SIGNIFICANT CHANGE UP (ref 80–100)
MONOCYTES # BLD AUTO: 0.66 K/UL — SIGNIFICANT CHANGE UP (ref 0–0.9)
MONOCYTES NFR BLD AUTO: 10 % — SIGNIFICANT CHANGE UP (ref 2–14)
NEUTROPHILS # BLD AUTO: 5.54 K/UL — SIGNIFICANT CHANGE UP (ref 1.8–7.4)
NEUTROPHILS NFR BLD AUTO: 83.6 % — HIGH (ref 43–77)
NRBC # BLD: 0 /100 WBCS — SIGNIFICANT CHANGE UP (ref 0–0)
PLATELET # BLD AUTO: 195 K/UL — SIGNIFICANT CHANGE UP (ref 150–400)
POTASSIUM SERPL-MCNC: 4.1 MMOL/L — SIGNIFICANT CHANGE UP (ref 3.5–5.3)
POTASSIUM SERPL-SCNC: 4.1 MMOL/L — SIGNIFICANT CHANGE UP (ref 3.5–5.3)
PROT SERPL-MCNC: 7.3 G/DL — SIGNIFICANT CHANGE UP (ref 6–8.3)
RBC # BLD: 3.78 M/UL — LOW (ref 4.2–5.8)
RBC # FLD: 13.2 % — SIGNIFICANT CHANGE UP (ref 10.3–14.5)
SODIUM SERPL-SCNC: 143 MMOL/L — SIGNIFICANT CHANGE UP (ref 135–145)
WBC # BLD: 6.62 K/UL — SIGNIFICANT CHANGE UP (ref 3.8–10.5)
WBC # FLD AUTO: 6.62 K/UL — SIGNIFICANT CHANGE UP (ref 3.8–10.5)

## 2024-07-30 PROCEDURE — 77427 RADIATION TX MANAGEMENT X5: CPT

## 2024-07-30 PROCEDURE — 77387B: CUSTOM | Mod: 26

## 2024-07-31 ENCOUNTER — NON-APPOINTMENT (OUTPATIENT)
Age: 64
End: 2024-07-31

## 2024-07-31 VITALS
BODY MASS INDEX: 32.96 KG/M2 | HEIGHT: 71 IN | OXYGEN SATURATION: 97 % | TEMPERATURE: 97.8 F | WEIGHT: 235.45 LBS | HEART RATE: 71 BPM | RESPIRATION RATE: 16 BRPM | SYSTOLIC BLOOD PRESSURE: 110 MMHG | DIASTOLIC BLOOD PRESSURE: 61 MMHG

## 2024-07-31 DIAGNOSIS — C76.0 MALIGNANT NEOPLASM OF HEAD, FACE AND NECK: ICD-10-CM

## 2024-07-31 DIAGNOSIS — R11.2 NAUSEA WITH VOMITING, UNSPECIFIED: ICD-10-CM

## 2024-07-31 DIAGNOSIS — Z51.11 ENCOUNTER FOR ANTINEOPLASTIC CHEMOTHERAPY: ICD-10-CM

## 2024-07-31 LAB — T4 FREE+ TSH PNL SERPL: 0.54 UIU/ML — SIGNIFICANT CHANGE UP (ref 0.27–4.2)

## 2024-07-31 PROCEDURE — 77387B: CUSTOM | Mod: 26

## 2024-07-31 NOTE — REVIEW OF SYSTEMS
[Dysphagia: Grade 0] : Dysphagia: Grade 0 [Fatigue: Grade 0] : Fatigue: Grade 0 [Oral Pain: Grade 0] : Oral Pain: Grade 0 [Dysgeusia: Grade 0] : Dysgeusia: Grade 0 [Skin Hyperpigmentation: Grade 0] : Skin Hyperpigmentation: Grade 0 [Dermatitis Radiation: Grade 0] : Dermatitis Radiation: Grade 0 [Fatigue: Grade 1 - Fatigue relieved by rest] : Fatigue: Grade 1 - Fatigue relieved by rest [FreeTextEntry5] : + neck mass RIGHT [Dysgeusia: Grade 2 - Altered taste with change in diet (e.g., oral supplements); noxious or unpleasant taste; loss of taste] : Dysgeusia: Grade 2 - Altered taste with change in diet (e.g., oral supplements); noxious or unpleasant taste; loss of taste [de-identified] : tolerating regular consistency foods [Skin Hyperpigmentation: Grade 1 - Hyperpigmentation covering <10% BSA; no psychosocial impact] : Skin Hyperpigmentation: Grade 1 - Hyperpigmentation covering <10% BSA; no psychosocial impact [Dermatitis Radiation: Grade 1 - Faint erythema or dry desquamation] : Dermatitis Radiation: Grade 1 - Faint erythema or dry desquamation

## 2024-07-31 NOTE — HISTORY OF PRESENT ILLNESS
[FreeTextEntry1] : 63 yr. 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 S/p biopsy on 5/7/2024.  Fine Needle Aspiration Report - Auth (Verified) specimen(s) Submitted LYMPH NODE, CERVICAL, RIGHT, L3, US GUIDED CORE BIOPSY AND FNA  Final Diagnosis LYMPH NODE, CERVICAL, RIGHT, L3, US GUIDED CORE BIOPSY AND FNA POSITIVE FOR MALIGNANT CELLS. Metastatic nonkeratinizing squamous cell carcinoma.  5/8/2024 MRI Neck: Impression: Large right oropharyngeal/hypopharyngeal mass with right level 2 and level 3 adenopathy, with a dominant 5.5cm x 4.5cm right level 2 lymph node.   6/3/2024 PETCT: IMPRESSION: Abnormal skull-to-thigh FDG-PET/CT scan. 1. FDG-avid soft tissue mass centered in right palatine tonsil, extending into right vallecula, piriform sinus, and abutting the epiglottis corresponds to patient's primary squamous cell carcinoma. 2. FDG-avid right cervical lymphadenopathy corresponds to biopsy-proven metastatic disease. A small FDG-avid right supraclavicular lymph node is suspicious for metastatic disease. Few small FDG-avid left cervical lymph nodes and right axillary node are nonspecific. Further evaluation may be performed with ultrasound and percutaneous needle biopsy, if indicated. 3. Left frontotemporal craniotomy with subadjacent postsurgical changes and aneurysm clips. There is a small photopenic low density area in the left frontal lobe which may represent cerebral infarction. Correlate clinically and with prior dedicated imaging of brain.  7/10/2024 Presents today for OTV. Completed 2/35 Fxs  to oropharynx/neck thus far doing well. Today w/o any verbalized concerns. Denies difficulty with swallowing eating regular foods w/o difficulty.  7/17/2024 OTV Completed fx 7/35. He states that he is doing well without symptoms at this time. He will being moisturizing the skin. He is tolerating PO intake without issues. No pain.  7/24/2024: Patient presents to clinic for OTV s/p 12/35fx of [id number] to the oropharynx/neck. Feeling well. Denies pain, trouble swallowing, Weight stable.   7/31/2024 OTV. Completed fx 17/35. Doing well. Maintaining weight. States has no taste. Using magic mouthwash with good pain control.

## 2024-07-31 NOTE — DISEASE MANAGEMENT
[Clinical] : TNM Stage: c [ESTEE] : ESTEE [TTNM] : 4 [NTNM] : 1 [MTNM] : 0 [de-identified] : [id number] [de-identified] : [id number] [de-identified] : Oropharynx/neck

## 2024-07-31 NOTE — REVIEW OF SYSTEMS
[Dysphagia: Grade 0] : Dysphagia: Grade 0 [Fatigue: Grade 0] : Fatigue: Grade 0 [Oral Pain: Grade 0] : Oral Pain: Grade 0 [Dysgeusia: Grade 0] : Dysgeusia: Grade 0 [Skin Hyperpigmentation: Grade 0] : Skin Hyperpigmentation: Grade 0 [Dermatitis Radiation: Grade 0] : Dermatitis Radiation: Grade 0 [Fatigue: Grade 1 - Fatigue relieved by rest] : Fatigue: Grade 1 - Fatigue relieved by rest [FreeTextEntry5] : + neck mass RIGHT [Dysgeusia: Grade 2 - Altered taste with change in diet (e.g., oral supplements); noxious or unpleasant taste; loss of taste] : Dysgeusia: Grade 2 - Altered taste with change in diet (e.g., oral supplements); noxious or unpleasant taste; loss of taste [de-identified] : tolerating regular consistency foods [Skin Hyperpigmentation: Grade 1 - Hyperpigmentation covering <10% BSA; no psychosocial impact] : Skin Hyperpigmentation: Grade 1 - Hyperpigmentation covering <10% BSA; no psychosocial impact [Dermatitis Radiation: Grade 1 - Faint erythema or dry desquamation] : Dermatitis Radiation: Grade 1 - Faint erythema or dry desquamation

## 2024-07-31 NOTE — HISTORY OF PRESENT ILLNESS
[FreeTextEntry1] : 63 yr. 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 S/p biopsy on 5/7/2024.  Fine Needle Aspiration Report - Auth (Verified) specimen(s) Submitted LYMPH NODE, CERVICAL, RIGHT, L3, US GUIDED CORE BIOPSY AND FNA  Final Diagnosis LYMPH NODE, CERVICAL, RIGHT, L3, US GUIDED CORE BIOPSY AND FNA POSITIVE FOR MALIGNANT CELLS. Metastatic nonkeratinizing squamous cell carcinoma.  5/8/2024 MRI Neck: Impression: Large right oropharyngeal/hypopharyngeal mass with right level 2 and level 3 adenopathy, with a dominant 5.5cm x 4.5cm right level 2 lymph node.   6/3/2024 PETCT: IMPRESSION: Abnormal skull-to-thigh FDG-PET/CT scan. 1. FDG-avid soft tissue mass centered in right palatine tonsil, extending into right vallecula, piriform sinus, and abutting the epiglottis corresponds to patient's primary squamous cell carcinoma. 2. FDG-avid right cervical lymphadenopathy corresponds to biopsy-proven metastatic disease. A small FDG-avid right supraclavicular lymph node is suspicious for metastatic disease. Few small FDG-avid left cervical lymph nodes and right axillary node are nonspecific. Further evaluation may be performed with ultrasound and percutaneous needle biopsy, if indicated. 3. Left frontotemporal craniotomy with subadjacent postsurgical changes and aneurysm clips. There is a small photopenic low density area in the left frontal lobe which may represent cerebral infarction. Correlate clinically and with prior dedicated imaging of brain.  7/10/2024 Presents today for OTV. Completed 2/35 Fxs  to oropharynx/neck thus far doing well. Today w/o any verbalized concerns. Denies difficulty with swallowing eating regular foods w/o difficulty.  7/17/2024 OTV Completed fx 7/35. He states that he is doing well without symptoms at this time. He will being moisturizing the skin. He is tolerating PO intake without issues. No pain.  7/24/2024: Patient presents to clinic for OTV s/p 12/35fx of [id number] to the oropharynx/neck. Feeling well. Denies pain, trouble swallowing, Weight stable.   7/31/2024 OTV. Completed fx 17/35. Doing well. Maintaining weight. States has no taste. Using magic mouthwash with good pain control.

## 2024-07-31 NOTE — DISEASE MANAGEMENT
[Clinical] : TNM Stage: c [ESTEE] : ESTEE [TTNM] : 4 [NTNM] : 1 [MTNM] : 0 [de-identified] : 2189 [de-identified] : 9063 [de-identified] : Oropharynx/neck

## 2024-08-01 PROCEDURE — 77387B: CUSTOM | Mod: 26

## 2024-08-02 ENCOUNTER — APPOINTMENT (OUTPATIENT)
Dept: HEMATOLOGY ONCOLOGY | Facility: CLINIC | Age: 64
End: 2024-08-02
Payer: COMMERCIAL

## 2024-08-02 ENCOUNTER — APPOINTMENT (OUTPATIENT)
Dept: INFUSION THERAPY | Facility: HOSPITAL | Age: 64
End: 2024-08-02

## 2024-08-02 ENCOUNTER — APPOINTMENT (OUTPATIENT)
Dept: HEMATOLOGY ONCOLOGY | Facility: CLINIC | Age: 64
End: 2024-08-02

## 2024-08-02 DIAGNOSIS — C10.9 MALIGNANT NEOPLASM OF OROPHARYNX, UNSPECIFIED: ICD-10-CM

## 2024-08-02 PROCEDURE — 99214 OFFICE O/P EST MOD 30 MIN: CPT

## 2024-08-02 PROCEDURE — 77387B: CUSTOM | Mod: 26

## 2024-08-02 NOTE — ASSESSMENT
[FreeTextEntry1] : 62 yo m with OPSCC, stage III or IV, P16 pos, Based on imaging, at least stage III There was some concern that disease may be more advanced given supraclav adenopathy However, after extensive discussion at , it was felt that disease is confined to neck and all areas of disease can be addressed with RT. Therefore, pt was started on chemo and RT.  Carboplatin was chosen because of baseline elevated creatinine at 1.3. He started this chemo on July 10th 2024. Creatinine has improved to normal levels now. Pt has been receiving IV hydration weekly and this may been instrumental in normalizing renal function.   Plan:  -Continue carboplatin  -Continue RT. Plan for a total of 35 frx to oropharynx/neck. Completed 19/35 frx thus far -We reiterated the importance of timely reporting of AEs -Nutrition follow up OV with each chemo

## 2024-08-02 NOTE — PHYSICAL EXAM
[Fully active, able to carry on all pre-disease performance without restriction] : Status 0 - Fully active, able to carry on all pre-disease performance without restriction [Normal] : affect appropriate [de-identified] : rt neck mass remarkably smaller

## 2024-08-02 NOTE — HISTORY OF PRESENT ILLNESS
[Disease: _____________________] : Disease: [unfilled] [de-identified] : 63-year-old male presents for consult to med onc for head and neck sq cell ca  Started with noticing lump rt neck 2 months ago, grew rapidly, went to PCP and then ENT FNA from 5/7 showed pathology consistent with Metastatic nonkeratinizing squamous cell carcinoma. P16 strongly  4/11/24: US FINDINGS: Palpable mass on the right side of the neck lateral to the right submandibular gland corresponds to a 4.2 x 5.6 cm vascular heterogeneous predominately hypoechoic solid mass. Cervical lymph nodes are seen as follows: On the right: Level 3: A 1.8 x 0.8 x 1.7 cm lymph node with a fatty echogenic hilum and normal thin cortex, an adjacent 1.5 x 0.6 1.8 cm lymph node with a fatty echogenic hilum and normal thin cortex, an adjacent 1.8 x 0.6 x 1.6 cm lymph node a fatty echogenic hilum and normal thin cortex, an adjacent 1.1 x 0.9 x 1.2 cm lymph node with a fatty echogenic h and normal thin cortex Level 4: A 1.7 x 0.8 x 0.8 cm lymph node with a fatty echogenic hilum and normal thin cortex On the left: Level 2: A 2.5 x 1.2 x 1.6 cm lymph node with a fatty echogenic hilum and normal thin cortex Level 3: A 1.3 x 0.6 x 0.9 cm lymph node with a fatty echogenic hilum and normal thin cortex, an adjacent 1.8 x 0.4 1.4 cm lymph node with a fatty echogenic hilum and normal thin cortex. The submandibular and parotid glands are symmetric and homogeneous in appearance bilaterally. IMPRESSION: Palpable mass corresponds to a 5 6 cm vascular hypochoic solid mass lateral to the right submandibular gland. Neoplasm is not excluded. Recommend MRI of the neck without and with contrast for further evaluation.  4/25/24-CT: IMPRESSION Mildly lobulated ovoid soft tissue density positioned between the sternocleidomastoid, parotid, submandibular gland and carotid sheath on the right. Differential diagnosis includes parotid neoplasm, schwannoma, paraganglioma, metastasis among other etiologies. Biopsy can be performed as clinically indicated. Right lingual/palatine tonsil soft tissue prominence and protrusion with into the oropharynx measuring 3.1 x 1.9 x 3.6 cm suspicious neoplastic process without definite focal enhancement. Contact with the mucosal surface on the opposite side of the oropharynx. Prominent posterior nasopharyngeal soft tissue without definite focal lesion. Direct visualization is recommended. Biopsy can be performed as indicated. A few bilateral enlarged lymph nodes may reflect metastatic disease. Clinical correlation recommended. Status post left parietal craniotomy as well as a  shunt (done Dec 5. 2013). 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 Suboptimal evaluation of cerebral parenchyma secondary to streak artifact. Clinical correlation and comparison with prior imaging is recommended if available. MRI and/or MRA of the brain with and without contrast can be performed as clinically indicated.    5/9/24-MRI: Large right oropharyngeal/hypopharyngeal mass with right level 2 and level 3 adenoathy, with a dominant 5.5 cm x 4.5 cm right level 2 lymph node.  CT Soft Tissue Neck 5/16/24: IMPRESSION: Mildly lobulated ovoid soft tissue density positioned between the sternocleidomastoid, parotid, submandibular gland and carotid sheath on the right. Differential diagnosis includes parotid neoplasm, schwannoma, paraganglioma, metastasis among other etiologies. Biopsy can be performed as clinically indicated. Right lingual/palatine tonsil soft tissue prominence and protrusion with into the oropharynx measuring 3.1 x 1.9 x 3.6 cm suspicious neoplastic process without definite focal enhancement. Contact with the mucosal surface on the opposite side of the oropharynx. Prominent posterior nasopharyngeal soft tissue without definite focal lesion. Direct visualization is recommended. Biopsy can be performed as indicated. A few bilateral enlarged lymph nodes may reflect metastatic disease. Clinical correlation recommended. Status post left parietal craniotomy. 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 Suboptimal evaluation of cerebral parenchyma secondary to streak artifact. Clinical correlation and comparison with prior imaging is recommended if available. MRI and/or MRA of the brain with and without contrast can be performed as clinically indicated.   He first noted right neck mass about 3 months ago. He reports fluctuates in size. Experiences intermittent dysphonia since before mass was found. Denies dysphagia, dyspnea. Reports increasing congestion. Denies unintentional weight loss, night sweats, or chills. No smoking history. No cancer history in the family.  PET/CT done 6/3/24 IMPRESSION: Abnormal skull-to-thigh FDG-PET/CT scan. 1. FDG-avid soft tissue mass centered in right palatine tonsil, extending into right vallecula, piriform sinus, and abutting the epiglottis corresponds to patient's primary squamous cell carcinoma. 2. FDG-avid right cervical lymphadenopathy corresponds to biopsy-proven metastatic disease. A small FDG-avid right supraclavicular lymph node is suspicious for metastatic disease. Few small FDG-avid left cervical lymph nodes and right axillary node are nonspecific. Further evaluation may be performed with ultrasound and percutaneous needle biopsy, if indicated. 3. Left frontotemporal craniotomy with subadjacent postsurgical changes and aneurysm clips. There is a small photopenic low density area in the left frontal lobe which may represent cerebral infarction. Correlate clinically and with prior dedicated imaging of brain.  Interval history  7/19/24: Pt states he feels well. He notes that the neck mass is smaller. He denies any dysphagia/odynophagia/weight loss.   7/23/24: Patient seen today for follow up while receiving C3 of carbo with concurrent RT. He reports feeling overall well. Feels the mass on his neck is smaller and softer. Denies N/V/D/C, fever, chills, odynophagia.   8/2/24: Patient seen today for follow up while receiving IVF hydration. He is accompanied by his wife. He continues with RT. He reports feeling overall well. Continues to maintain PO intake, able to eat solid foods. Denies N/V/D/C, fever, neuropathy, pain.    Disease: head and neck cancer   Pathology: sq cell ca   TNM stage: T4, N3, Mx AJCC Stage: III/IV   [de-identified] : sq cell ca

## 2024-08-05 ENCOUNTER — APPOINTMENT (OUTPATIENT)
Dept: OTOLARYNGOLOGY | Facility: CLINIC | Age: 64
End: 2024-08-05

## 2024-08-05 DIAGNOSIS — E86.0 DEHYDRATION: ICD-10-CM

## 2024-08-05 PROCEDURE — 77014: CPT | Mod: 26

## 2024-08-05 PROCEDURE — 92526 ORAL FUNCTION THERAPY: CPT | Mod: GN

## 2024-08-06 ENCOUNTER — RESULT REVIEW (OUTPATIENT)
Age: 64
End: 2024-08-06

## 2024-08-06 ENCOUNTER — APPOINTMENT (OUTPATIENT)
Dept: HEMATOLOGY ONCOLOGY | Facility: CLINIC | Age: 64
End: 2024-08-06

## 2024-08-06 ENCOUNTER — APPOINTMENT (OUTPATIENT)
Dept: INFUSION THERAPY | Facility: HOSPITAL | Age: 64
End: 2024-08-06

## 2024-08-06 LAB
ALBUMIN SERPL ELPH-MCNC: 3.8 G/DL — SIGNIFICANT CHANGE UP (ref 3.3–5)
ALP SERPL-CCNC: 64 U/L — SIGNIFICANT CHANGE UP (ref 40–120)
ALT FLD-CCNC: 12 U/L — SIGNIFICANT CHANGE UP (ref 10–45)
ANION GAP SERPL CALC-SCNC: 11 MMOL/L — SIGNIFICANT CHANGE UP (ref 5–17)
AST SERPL-CCNC: 14 U/L — SIGNIFICANT CHANGE UP (ref 10–40)
BASOPHILS # BLD AUTO: 0.03 K/UL — SIGNIFICANT CHANGE UP (ref 0–0.2)
BASOPHILS NFR BLD AUTO: 0.6 % — SIGNIFICANT CHANGE UP (ref 0–2)
BILIRUB SERPL-MCNC: 0.9 MG/DL — SIGNIFICANT CHANGE UP (ref 0.2–1.2)
BUN SERPL-MCNC: 17 MG/DL — SIGNIFICANT CHANGE UP (ref 7–23)
CALCIUM SERPL-MCNC: 9.7 MG/DL — SIGNIFICANT CHANGE UP (ref 8.4–10.5)
CHLORIDE SERPL-SCNC: 104 MMOL/L — SIGNIFICANT CHANGE UP (ref 96–108)
CO2 SERPL-SCNC: 26 MMOL/L — SIGNIFICANT CHANGE UP (ref 22–31)
CREAT SERPL-MCNC: 1.19 MG/DL — SIGNIFICANT CHANGE UP (ref 0.5–1.3)
EGFR: 70 ML/MIN/1.73M2 — SIGNIFICANT CHANGE UP
EOSINOPHIL # BLD AUTO: 0.09 K/UL — SIGNIFICANT CHANGE UP (ref 0–0.5)
EOSINOPHIL NFR BLD AUTO: 1.7 % — SIGNIFICANT CHANGE UP (ref 0–6)
GLUCOSE SERPL-MCNC: 92 MG/DL — SIGNIFICANT CHANGE UP (ref 70–99)
HCT VFR BLD CALC: 35.7 % — LOW (ref 39–50)
HGB BLD-MCNC: 12.6 G/DL — LOW (ref 13–17)
IMM GRANULOCYTES NFR BLD AUTO: 0.6 % — SIGNIFICANT CHANGE UP (ref 0–0.9)
LYMPHOCYTES # BLD AUTO: 0.23 K/UL — LOW (ref 1–3.3)
LYMPHOCYTES # BLD AUTO: 4.4 % — LOW (ref 13–44)
MAGNESIUM SERPL-MCNC: 2.3 MG/DL — SIGNIFICANT CHANGE UP (ref 1.6–2.6)
MCHC RBC-ENTMCNC: 33.8 PG — SIGNIFICANT CHANGE UP (ref 27–34)
MCHC RBC-ENTMCNC: 35.3 G/DL — SIGNIFICANT CHANGE UP (ref 32–36)
MCV RBC AUTO: 95.7 FL — SIGNIFICANT CHANGE UP (ref 80–100)
MONOCYTES # BLD AUTO: 0.61 K/UL — SIGNIFICANT CHANGE UP (ref 0–0.9)
MONOCYTES NFR BLD AUTO: 11.8 % — SIGNIFICANT CHANGE UP (ref 2–14)
NEUTROPHILS # BLD AUTO: 4.2 K/UL — SIGNIFICANT CHANGE UP (ref 1.8–7.4)
NEUTROPHILS NFR BLD AUTO: 80.9 % — HIGH (ref 43–77)
NRBC # BLD: 0 /100 WBCS — SIGNIFICANT CHANGE UP (ref 0–0)
PLATELET # BLD AUTO: 144 K/UL — LOW (ref 150–400)
POTASSIUM SERPL-MCNC: 3.6 MMOL/L — SIGNIFICANT CHANGE UP (ref 3.5–5.3)
POTASSIUM SERPL-SCNC: 3.6 MMOL/L — SIGNIFICANT CHANGE UP (ref 3.5–5.3)
PROT SERPL-MCNC: 7.2 G/DL — SIGNIFICANT CHANGE UP (ref 6–8.3)
RBC # BLD: 3.73 M/UL — LOW (ref 4.2–5.8)
RBC # FLD: 13.7 % — SIGNIFICANT CHANGE UP (ref 10.3–14.5)
SODIUM SERPL-SCNC: 141 MMOL/L — SIGNIFICANT CHANGE UP (ref 135–145)
WBC # BLD: 5.19 K/UL — SIGNIFICANT CHANGE UP (ref 3.8–10.5)
WBC # FLD AUTO: 5.19 K/UL — SIGNIFICANT CHANGE UP (ref 3.8–10.5)

## 2024-08-06 PROCEDURE — 77387B: CUSTOM | Mod: 26

## 2024-08-06 PROCEDURE — 77427 RADIATION TX MANAGEMENT X5: CPT

## 2024-08-07 ENCOUNTER — NON-APPOINTMENT (OUTPATIENT)
Age: 64
End: 2024-08-07

## 2024-08-07 LAB — T4 FREE+ TSH PNL SERPL: 0.55 UIU/ML — SIGNIFICANT CHANGE UP (ref 0.27–4.2)

## 2024-08-07 PROCEDURE — 77387B: CUSTOM | Mod: 26

## 2024-08-07 NOTE — DISEASE MANAGEMENT
[Clinical] : TNM Stage: c [ESTEE] : ESTEE [TTNM] : 4 [NTNM] : 1 [MTNM] : 0 [de-identified] : 1221 [de-identified] : 7609 [de-identified] : Oropharynx/neck

## 2024-08-07 NOTE — REVIEW OF SYSTEMS
[Dysphagia: Grade 1 - Symptomatic, able to eat regular diet] : Dysphagia: Grade 1 - Symptomatic, able to eat regular diet [Fatigue: Grade 1 - Fatigue relieved by rest] : Fatigue: Grade 1 - Fatigue relieved by rest [FreeTextEntry5] : + neck mass RIGHT [Oral Pain: Grade 0] : Oral Pain: Grade 0 [Dysgeusia: Grade 2 - Altered taste with change in diet (e.g., oral supplements); noxious or unpleasant taste; loss of taste] : Dysgeusia: Grade 2 - Altered taste with change in diet (e.g., oral supplements); noxious or unpleasant taste; loss of taste [Skin Hyperpigmentation: Grade 1 - Hyperpigmentation covering <10% BSA; no psychosocial impact] : Skin Hyperpigmentation: Grade 1 - Hyperpigmentation covering <10% BSA; no psychosocial impact [de-identified] : tolerating regular consistency foods [Dermatitis Radiation: Grade 1 - Faint erythema or dry desquamation] : Dermatitis Radiation: Grade 1 - Faint erythema or dry desquamation

## 2024-08-07 NOTE — REVIEW OF SYSTEMS
[Dysphagia: Grade 1 - Symptomatic, able to eat regular diet] : Dysphagia: Grade 1 - Symptomatic, able to eat regular diet [Fatigue: Grade 1 - Fatigue relieved by rest] : Fatigue: Grade 1 - Fatigue relieved by rest [FreeTextEntry5] : + neck mass RIGHT [Oral Pain: Grade 0] : Oral Pain: Grade 0 [Dysgeusia: Grade 2 - Altered taste with change in diet (e.g., oral supplements); noxious or unpleasant taste; loss of taste] : Dysgeusia: Grade 2 - Altered taste with change in diet (e.g., oral supplements); noxious or unpleasant taste; loss of taste [Skin Hyperpigmentation: Grade 1 - Hyperpigmentation covering <10% BSA; no psychosocial impact] : Skin Hyperpigmentation: Grade 1 - Hyperpigmentation covering <10% BSA; no psychosocial impact [de-identified] : tolerating regular consistency foods [Dermatitis Radiation: Grade 1 - Faint erythema or dry desquamation] : Dermatitis Radiation: Grade 1 - Faint erythema or dry desquamation

## 2024-08-07 NOTE — HISTORY OF PRESENT ILLNESS
[FreeTextEntry1] : 63 yr. 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 S/p biopsy on 5/7/2024.  Fine Needle Aspiration Report - Auth (Verified) specimen(s) Submitted LYMPH NODE, CERVICAL, RIGHT, L3, US GUIDED CORE BIOPSY AND FNA  Final Diagnosis LYMPH NODE, CERVICAL, RIGHT, L3, US GUIDED CORE BIOPSY AND FNA POSITIVE FOR MALIGNANT CELLS. Metastatic nonkeratinizing squamous cell carcinoma.  5/8/2024 MRI Neck: Impression: Large right oropharyngeal/hypopharyngeal mass with right level 2 and level 3 adenopathy, with a dominant 5.5cm x 4.5cm right level 2 lymph node.   6/3/2024 PETCT: IMPRESSION: Abnormal skull-to-thigh FDG-PET/CT scan. 1. FDG-avid soft tissue mass centered in right palatine tonsil, extending into right vallecula, piriform sinus, and abutting the epiglottis corresponds to patient's primary squamous cell carcinoma. 2. FDG-avid right cervical lymphadenopathy corresponds to biopsy-proven metastatic disease. A small FDG-avid right supraclavicular lymph node is suspicious for metastatic disease. Few small FDG-avid left cervical lymph nodes and right axillary node are nonspecific. Further evaluation may be performed with ultrasound and percutaneous needle biopsy, if indicated. 3. Left frontotemporal craniotomy with subadjacent postsurgical changes and aneurysm clips. There is a small photopenic low density area in the left frontal lobe which may represent cerebral infarction. Correlate clinically and with prior dedicated imaging of brain.  7/10/2024 Presents today for OTV. Completed 2/35 Fxs  to oropharynx/neck thus far doing well. Today w/o any verbalized concerns. Denies difficulty with swallowing eating regular foods w/o difficulty.  7/17/2024 OTV Completed fx 7/35. He states that he is doing well without symptoms at this time. He will being moisturizing the skin. He is tolerating PO intake without issues. No pain.  7/24/2024: Patient presents to clinic for OTV s/p 12/35fx of [id number] to the oropharynx/neck. Feeling well. Denies pain, trouble swallowing, Weight stable.   7/31/2024 OTV. Completed fx 17/35. Doing well. Maintaining weight. States has no taste. Using magic mouthwash with good pain control.  8/07/2024 Patient presents today for OTV s/p 22/35 fx. He has no taste. He has mucositis in the back of the oropharynx. He has discomfort when he eats and is using the magic mouthwash. 6 lb weight loss this week.

## 2024-08-07 NOTE — HISTORY OF PRESENT ILLNESS
[FreeTextEntry1] : 63 yr. 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 S/p biopsy on 5/7/2024.  Fine Needle Aspiration Report - Auth (Verified) specimen(s) Submitted LYMPH NODE, CERVICAL, RIGHT, L3, US GUIDED CORE BIOPSY AND FNA  Final Diagnosis LYMPH NODE, CERVICAL, RIGHT, L3, US GUIDED CORE BIOPSY AND FNA POSITIVE FOR MALIGNANT CELLS. Metastatic nonkeratinizing squamous cell carcinoma.  5/8/2024 MRI Neck: Impression: Large right oropharyngeal/hypopharyngeal mass with right level 2 and level 3 adenopathy, with a dominant 5.5cm x 4.5cm right level 2 lymph node.   6/3/2024 PETCT: IMPRESSION: Abnormal skull-to-thigh FDG-PET/CT scan. 1. FDG-avid soft tissue mass centered in right palatine tonsil, extending into right vallecula, piriform sinus, and abutting the epiglottis corresponds to patient's primary squamous cell carcinoma. 2. FDG-avid right cervical lymphadenopathy corresponds to biopsy-proven metastatic disease. A small FDG-avid right supraclavicular lymph node is suspicious for metastatic disease. Few small FDG-avid left cervical lymph nodes and right axillary node are nonspecific. Further evaluation may be performed with ultrasound and percutaneous needle biopsy, if indicated. 3. Left frontotemporal craniotomy with subadjacent postsurgical changes and aneurysm clips. There is a small photopenic low density area in the left frontal lobe which may represent cerebral infarction. Correlate clinically and with prior dedicated imaging of brain.  7/10/2024 Presents today for OTV. Completed 2/35 Fxs  to oropharynx/neck thus far doing well. Today w/o any verbalized concerns. Denies difficulty with swallowing eating regular foods w/o difficulty.  7/17/2024 OTV Completed fx 7/35. He states that he is doing well without symptoms at this time. He will being moisturizing the skin. He is tolerating PO intake without issues. No pain.  7/24/2024: Patient presents to clinic for OTV s/p 12/35fx of [id number] to the oropharynx/neck. Feeling well. Denies pain, trouble swallowing, Weight stable.   7/31/2024 OTV. Completed fx 17/35. Doing well. Maintaining weight. States has no taste. Using magic mouthwash with good pain control.  8/07/2024 Patient presents today for OTV s/p 22/35 fx. He has no taste. He has mucositis in the back of the oropharynx. He has discomfort when he eats and is using the magic mouthwash. 6 lb weight loss this week.

## 2024-08-07 NOTE — DISEASE MANAGEMENT
[Clinical] : TNM Stage: c [ESTEE] : ESTEE [TTNM] : 4 [NTNM] : 1 [MTNM] : 0 [de-identified] : 5010 [de-identified] : 3993 [de-identified] : Oropharynx/neck

## 2024-08-08 PROCEDURE — 77387B: CUSTOM | Mod: 26

## 2024-08-09 ENCOUNTER — APPOINTMENT (OUTPATIENT)
Dept: HEMATOLOGY ONCOLOGY | Facility: CLINIC | Age: 64
End: 2024-08-09

## 2024-08-09 ENCOUNTER — APPOINTMENT (OUTPATIENT)
Dept: INFUSION THERAPY | Facility: HOSPITAL | Age: 64
End: 2024-08-09

## 2024-08-09 PROCEDURE — 77387B: CUSTOM | Mod: 26

## 2024-08-12 ENCOUNTER — APPOINTMENT (OUTPATIENT)
Dept: OTOLARYNGOLOGY | Facility: CLINIC | Age: 64
End: 2024-08-12
Payer: COMMERCIAL

## 2024-08-12 VITALS
BODY MASS INDEX: 32.2 KG/M2 | WEIGHT: 230 LBS | HEART RATE: 81 BPM | DIASTOLIC BLOOD PRESSURE: 71 MMHG | SYSTOLIC BLOOD PRESSURE: 108 MMHG | OXYGEN SATURATION: 97 % | HEIGHT: 71 IN

## 2024-08-12 PROCEDURE — 92526 ORAL FUNCTION THERAPY: CPT | Mod: GN

## 2024-08-12 PROCEDURE — 31575 DIAGNOSTIC LARYNGOSCOPY: CPT

## 2024-08-12 PROCEDURE — 77014: CPT | Mod: 26

## 2024-08-12 PROCEDURE — 99214 OFFICE O/P EST MOD 30 MIN: CPT | Mod: 25

## 2024-08-12 NOTE — HISTORY OF PRESENT ILLNESS
[de-identified] : 63 year old male presents for follow up of a 5.6 cm right neck mass lateral to the right submandibular gland with associated cervical adenopathy.  FNA from 5/7 showed pathology consistent with Metastatic nonkeratinizing squamous cell carcinoma. He is currently undergoing CRT with weekly carbo with concurrent RT.  Reports having no taste and has lost 4 lbs since beginning treatment and has sore throat. tolerating regular diet and drinking without issues   No changes in symptoms since last clinic visit.  Denies recent fevers/infections, chills, night sweats, unintentional weight loss.   MRI Neck 5/9/2024 Impression: Large right oropharyngeal/hypopharyngeal mass with right level 2 and level 3 adenopathy, with a dominant 5.5 cm x 4.5 cm right level 2 lymph node.   CT Soft Tissue Neck IMPRESSION: Mildly lobulated ovoid soft tissue density positioned between the sternocleidomastoid, parotid, submandibular gland and carotid sheath on the right. Differential diagnosis includes parotid neoplasm, schwannoma, paraganglioma, metastasis among other etiologies. Biopsy can be performed as clinically indicated.  Right lingual/palatine tonsil soft tissue prominence and protrusion with into the oropharynx measuring 3.1 x 1.9 x 3.6 cm suspicious neoplastic process without definite focal enhancement. Contact with the mucosal surface on the opposite side of the oropharynx. Prominent posterior nasopharyngeal soft tissue without definite focal lesion. Direct visualization is recommended. Biopsy can be performed as indicated. A few bilateral enlarged lymph nodes may reflect metastatic disease. Clinical correlation recommended. Status post left parietal craniotomy. 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 Suboptimal evaluation of cerebral parenchyma secondary to streak artifact. Clinical correlation and comparison with prior imaging is recommended if available. MRI and/or MRA of the brain with and without contrast can be performed as clinically indicated.  He first noted right neck mass about 3 months ago. He reports fluctuates in size. Experiences intermittent dysphonia since before mass was found. Denies dysphagia, dyspnea. Reports increasing congestion.  Denies unintentional weight loss, night sweats, or chills.  No smoking history.  No cancer history in the family.     4/25/24-CT: IMPRESSION Mildly lobulated ovoid soft tissue density positioned between the sternocleidomastoid, parotid, submandibular gland and carotid sheath on the right. Differential diagnosis includes parotid neoplasm, schwannoma, paraganglioma, metastasis among other etiologies. Biopsy can be performed as clinically indicated.  Right lingual/palatine tonsil soft tissue prominence and protrusion with into the oropharynx measuring 3.1 x 1.9 x 3.6 cm suspicious neoplastic process without definite focal enhancement. Contact with the mucosal surface on the opposite side of the oropharynx. Prominent posterior nasopharyngeal soft tissue without definite focal lesion. Direct visualization is recommended. Biopsy can be performed as indicated. A few bilateral enlarged lymph nodes may reflect metastatic disease. Clinical correlation recommended.  Status post left parietal craniotomy. 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 Suboptimal evaluation of cerebral parenchyma secondary to streak artifact. Clinical correlation and comparison with prior imaging is recommended if available. MRI and/or MRA of the brain with and without contrast can be performed as clinically indicated . 5/9/24-MRI: Large right oropharyngeal/hypopharyngeal mass with right level 2 and level 3 adenoathy, with a dominant 5.5 cm x 4.5 cm right level 2 lymph node.  4/11/24: US FINDINGS: Palpable mass on the right side of the neck lateral to the right submandibular gland corresponds to a 4.2 x 5.6 cm vascular heterogeneous predominately hypochoic solid mass.. Cervical lymph nodes are seen as follows: On the right: Level 3: A 1.8 x 0.8 x 1.7 cm lymph node with a fatty echogenic hilum and normal thin cortex, an adjacent 1.5 x 0.6 1.8 cm lymph node with a fatty echogenic hilum and normal thin cortex, an adjacent 1.8 x 0.6 x 1.6 cm lymph node a fatty echogenic hilum and normal thin cortex, an adjacent 1.1 x 0.9 x 1.2 cm lymph node with a fatty echogenic h and normal thin cortex Level 4: A 1.7 x 0.8 x 0.8 cm lymph node with a fatty echogenic hilum and normal thin cortex On the left: Level 2: A 2.5 x 1.2 x 1.6 cm lymph node with a fatty echogenic hilum and normal thin cortex Level 3: A 1.3 x 0.6 x 0.9 cm lymph node with a fatty echogenic hilum and normal thin cortex, an adjacent 1.8 x 0.4 1.4 cm lymph node with a fatty echogenic hilum and normal thin cortex. The submandibular and parotid glands are symmetric and homogeneous in appearance bilaterally. IMPRESSION: Palpable mass corresponds to a 5 6 cm vascular hypochoic solid mass lateral to the right submandibular gland. Neoplasm is not excluded. Recommend MRI of the neck without and with contrast for further evaluation.

## 2024-08-12 NOTE — ASSESSMENT
[FreeTextEntry1] : 63 year old male presents for follow up of 5.6 cm right level neck mass associated cervical adenopathy  Pathology consistent with SCC.  He is currently undergoing CRT with weekly carbo with concurrent RT.   -Pt is doing well in clinic today -Fiberoptic exam showed post RT changes with good inital response to treatment,  improvement in  Right inferior tonsil exophytic mass extending to right GT sulcus showing positive response to treatment  -[facility] Baseline sent prior to treatment, will send post treatment Moshe-Dx at next appointment  -Continue with med/rad Onc appointments -Follow up in 3 months to assess response to treatment   -They are in agreement with this plan

## 2024-08-12 NOTE — PHYSICAL EXAM
[de-identified] :  5 cm right level 2  neck mass, firm, nontender  [Midline] : trachea located in midline position [Normal] : no mass and no adenopathy

## 2024-08-12 NOTE — PHYSICAL EXAM
[de-identified] :  5 cm right level 2  neck mass, firm, nontender  [Midline] : trachea located in midline position [Normal] : no mass and no adenopathy

## 2024-08-12 NOTE — HISTORY OF PRESENT ILLNESS
[de-identified] : 63 year old male presents for follow up of a 5.6 cm right neck mass lateral to the right submandibular gland with associated cervical adenopathy.  FNA from 5/7 showed pathology consistent with Metastatic nonkeratinizing squamous cell carcinoma. He is currently undergoing CRT with weekly carbo with concurrent RT.  Reports having no taste and has lost 4 lbs since beginning treatment and has sore throat. tolerating regular diet and drinking without issues   No changes in symptoms since last clinic visit.  Denies recent fevers/infections, chills, night sweats, unintentional weight loss.   MRI Neck 5/9/2024 Impression: Large right oropharyngeal/hypopharyngeal mass with right level 2 and level 3 adenopathy, with a dominant 5.5 cm x 4.5 cm right level 2 lymph node.   CT Soft Tissue Neck IMPRESSION: Mildly lobulated ovoid soft tissue density positioned between the sternocleidomastoid, parotid, submandibular gland and carotid sheath on the right. Differential diagnosis includes parotid neoplasm, schwannoma, paraganglioma, metastasis among other etiologies. Biopsy can be performed as clinically indicated.  Right lingual/palatine tonsil soft tissue prominence and protrusion with into the oropharynx measuring 3.1 x 1.9 x 3.6 cm suspicious neoplastic process without definite focal enhancement. Contact with the mucosal surface on the opposite side of the oropharynx. Prominent posterior nasopharyngeal soft tissue without definite focal lesion. Direct visualization is recommended. Biopsy can be performed as indicated. A few bilateral enlarged lymph nodes may reflect metastatic disease. Clinical correlation recommended. Status post left parietal craniotomy. 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 Suboptimal evaluation of cerebral parenchyma secondary to streak artifact. Clinical correlation and comparison with prior imaging is recommended if available. MRI and/or MRA of the brain with and without contrast can be performed as clinically indicated.  He first noted right neck mass about 3 months ago. He reports fluctuates in size. Experiences intermittent dysphonia since before mass was found. Denies dysphagia, dyspnea. Reports increasing congestion.  Denies unintentional weight loss, night sweats, or chills.  No smoking history.  No cancer history in the family.     4/25/24-CT: IMPRESSION Mildly lobulated ovoid soft tissue density positioned between the sternocleidomastoid, parotid, submandibular gland and carotid sheath on the right. Differential diagnosis includes parotid neoplasm, schwannoma, paraganglioma, metastasis among other etiologies. Biopsy can be performed as clinically indicated.  Right lingual/palatine tonsil soft tissue prominence and protrusion with into the oropharynx measuring 3.1 x 1.9 x 3.6 cm suspicious neoplastic process without definite focal enhancement. Contact with the mucosal surface on the opposite side of the oropharynx. Prominent posterior nasopharyngeal soft tissue without definite focal lesion. Direct visualization is recommended. Biopsy can be performed as indicated. A few bilateral enlarged lymph nodes may reflect metastatic disease. Clinical correlation recommended.  Status post left parietal craniotomy. 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 Suboptimal evaluation of cerebral parenchyma secondary to streak artifact. Clinical correlation and comparison with prior imaging is recommended if available. MRI and/or MRA of the brain with and without contrast can be performed as clinically indicated . 5/9/24-MRI: Large right oropharyngeal/hypopharyngeal mass with right level 2 and level 3 adenoathy, with a dominant 5.5 cm x 4.5 cm right level 2 lymph node.  4/11/24: US FINDINGS: Palpable mass on the right side of the neck lateral to the right submandibular gland corresponds to a 4.2 x 5.6 cm vascular heterogeneous predominately hypochoic solid mass.. Cervical lymph nodes are seen as follows: On the right: Level 3: A 1.8 x 0.8 x 1.7 cm lymph node with a fatty echogenic hilum and normal thin cortex, an adjacent 1.5 x 0.6 1.8 cm lymph node with a fatty echogenic hilum and normal thin cortex, an adjacent 1.8 x 0.6 x 1.6 cm lymph node a fatty echogenic hilum and normal thin cortex, an adjacent 1.1 x 0.9 x 1.2 cm lymph node with a fatty echogenic h and normal thin cortex Level 4: A 1.7 x 0.8 x 0.8 cm lymph node with a fatty echogenic hilum and normal thin cortex On the left: Level 2: A 2.5 x 1.2 x 1.6 cm lymph node with a fatty echogenic hilum and normal thin cortex Level 3: A 1.3 x 0.6 x 0.9 cm lymph node with a fatty echogenic hilum and normal thin cortex, an adjacent 1.8 x 0.4 1.4 cm lymph node with a fatty echogenic hilum and normal thin cortex. The submandibular and parotid glands are symmetric and homogeneous in appearance bilaterally. IMPRESSION: Palpable mass corresponds to a 5 6 cm vascular hypochoic solid mass lateral to the right submandibular gland. Neoplasm is not excluded. Recommend MRI of the neck without and with contrast for further evaluation.

## 2024-08-13 ENCOUNTER — RESULT REVIEW (OUTPATIENT)
Age: 64
End: 2024-08-13

## 2024-08-13 ENCOUNTER — APPOINTMENT (OUTPATIENT)
Dept: HEMATOLOGY ONCOLOGY | Facility: CLINIC | Age: 64
End: 2024-08-13
Payer: COMMERCIAL

## 2024-08-13 ENCOUNTER — APPOINTMENT (OUTPATIENT)
Dept: INFUSION THERAPY | Facility: HOSPITAL | Age: 64
End: 2024-08-13

## 2024-08-13 LAB
ALBUMIN SERPL ELPH-MCNC: 4 G/DL — SIGNIFICANT CHANGE UP (ref 3.3–5)
ALP SERPL-CCNC: 69 U/L — SIGNIFICANT CHANGE UP (ref 40–120)
ALT FLD-CCNC: 13 U/L — SIGNIFICANT CHANGE UP (ref 10–45)
ANION GAP SERPL CALC-SCNC: 12 MMOL/L — SIGNIFICANT CHANGE UP (ref 5–17)
AST SERPL-CCNC: 15 U/L — SIGNIFICANT CHANGE UP (ref 10–40)
BASOPHILS # BLD AUTO: 0.02 K/UL — SIGNIFICANT CHANGE UP (ref 0–0.2)
BASOPHILS NFR BLD AUTO: 0.4 % — SIGNIFICANT CHANGE UP (ref 0–2)
BILIRUB SERPL-MCNC: 1.4 MG/DL — HIGH (ref 0.2–1.2)
BUN SERPL-MCNC: 15 MG/DL — SIGNIFICANT CHANGE UP (ref 7–23)
CALCIUM SERPL-MCNC: 9.6 MG/DL — SIGNIFICANT CHANGE UP (ref 8.4–10.5)
CHLORIDE SERPL-SCNC: 105 MMOL/L — SIGNIFICANT CHANGE UP (ref 96–108)
CO2 SERPL-SCNC: 28 MMOL/L — SIGNIFICANT CHANGE UP (ref 22–31)
CREAT SERPL-MCNC: 1.14 MG/DL — SIGNIFICANT CHANGE UP (ref 0.5–1.3)
EGFR: 73 ML/MIN/1.73M2 — SIGNIFICANT CHANGE UP
EOSINOPHIL # BLD AUTO: 0.06 K/UL — SIGNIFICANT CHANGE UP (ref 0–0.5)
EOSINOPHIL NFR BLD AUTO: 1.2 % — SIGNIFICANT CHANGE UP (ref 0–6)
GLUCOSE SERPL-MCNC: 91 MG/DL — SIGNIFICANT CHANGE UP (ref 70–99)
HCT VFR BLD CALC: 35.9 % — LOW (ref 39–50)
HGB BLD-MCNC: 12.9 G/DL — LOW (ref 13–17)
IMM GRANULOCYTES NFR BLD AUTO: 0.4 % — SIGNIFICANT CHANGE UP (ref 0–0.9)
LYMPHOCYTES # BLD AUTO: 0.18 K/UL — LOW (ref 1–3.3)
LYMPHOCYTES # BLD AUTO: 3.7 % — LOW (ref 13–44)
MAGNESIUM SERPL-MCNC: 2.4 MG/DL — SIGNIFICANT CHANGE UP (ref 1.6–2.6)
MCHC RBC-ENTMCNC: 33.7 PG — SIGNIFICANT CHANGE UP (ref 27–34)
MCHC RBC-ENTMCNC: 35.9 G/DL — SIGNIFICANT CHANGE UP (ref 32–36)
MCV RBC AUTO: 93.7 FL — SIGNIFICANT CHANGE UP (ref 80–100)
MONOCYTES # BLD AUTO: 0.59 K/UL — SIGNIFICANT CHANGE UP (ref 0–0.9)
MONOCYTES NFR BLD AUTO: 12.2 % — SIGNIFICANT CHANGE UP (ref 2–14)
NEUTROPHILS # BLD AUTO: 3.96 K/UL — SIGNIFICANT CHANGE UP (ref 1.8–7.4)
NEUTROPHILS NFR BLD AUTO: 82.1 % — HIGH (ref 43–77)
NRBC # BLD: 0 /100 WBCS — SIGNIFICANT CHANGE UP (ref 0–0)
PLATELET # BLD AUTO: 132 K/UL — LOW (ref 150–400)
POTASSIUM SERPL-MCNC: 3.8 MMOL/L — SIGNIFICANT CHANGE UP (ref 3.5–5.3)
POTASSIUM SERPL-SCNC: 3.8 MMOL/L — SIGNIFICANT CHANGE UP (ref 3.5–5.3)
PROT SERPL-MCNC: 7.7 G/DL — SIGNIFICANT CHANGE UP (ref 6–8.3)
RBC # BLD: 3.83 M/UL — LOW (ref 4.2–5.8)
RBC # FLD: 14.2 % — SIGNIFICANT CHANGE UP (ref 10.3–14.5)
SODIUM SERPL-SCNC: 145 MMOL/L — SIGNIFICANT CHANGE UP (ref 135–145)
T4 FREE+ TSH PNL SERPL: 0.41 UIU/ML — SIGNIFICANT CHANGE UP (ref 0.27–4.2)
WBC # BLD: 4.83 K/UL — SIGNIFICANT CHANGE UP (ref 3.8–10.5)
WBC # FLD AUTO: 4.83 K/UL — SIGNIFICANT CHANGE UP (ref 3.8–10.5)

## 2024-08-13 PROCEDURE — 77427 RADIATION TX MANAGEMENT X5: CPT

## 2024-08-13 PROCEDURE — 99214 OFFICE O/P EST MOD 30 MIN: CPT

## 2024-08-13 PROCEDURE — 77387B: CUSTOM | Mod: 26

## 2024-08-13 NOTE — PHYSICAL EXAM
[Fully active, able to carry on all pre-disease performance without restriction] : Status 0 - Fully active, able to carry on all pre-disease performance without restriction [Normal] : affect appropriate [de-identified] : rt neck mass remarkably smaller

## 2024-08-13 NOTE — ASSESSMENT
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 Therefore, pt was started on chemo and RT.  Carboplatin was chosen because of baseline elevated creatinine at 1.3. He started this chemo on July 10th 2024. Creatinine has improved to normal levels now. Pt has been receiving IV hydration weekly and this may been instrumental in normalizing renal function.   Plan:  -Continue carboplatin weekly  -Continue RT. Plan for a total of 35 frx to oropharynx/neck. He is planned to complete RT 8/23 -We reiterated the importance of timely reporting of AEs -Nutrition follow up OV with each chemo

## 2024-08-13 NOTE — HISTORY OF PRESENT ILLNESS
[Disease: _____________________] : Disease: [unfilled] [de-identified] : 63-year-old male presents for consult to med onc for head and neck sq cell ca  Started with noticing lump rt neck 2 months ago, grew rapidly, went to PCP and then ENT FNA from 5/7 showed pathology consistent with Metastatic nonkeratinizing squamous cell carcinoma. P16 strongly  4/11/24: US FINDINGS: Palpable mass on the right side of the neck lateral to the right submandibular gland corresponds to a 4.2 x 5.6 cm vascular heterogeneous predominately hypoechoic solid mass. Cervical lymph nodes are seen as follows: On the right: Level 3: A 1.8 x 0.8 x 1.7 cm lymph node with a fatty echogenic hilum and normal thin cortex, an adjacent 1.5 x 0.6 1.8 cm lymph node with a fatty echogenic hilum and normal thin cortex, an adjacent 1.8 x 0.6 x 1.6 cm lymph node a fatty echogenic hilum and normal thin cortex, an adjacent 1.1 x 0.9 x 1.2 cm lymph node with a fatty echogenic h and normal thin cortex Level 4: A 1.7 x 0.8 x 0.8 cm lymph node with a fatty echogenic hilum and normal thin cortex On the left: Level 2: A 2.5 x 1.2 x 1.6 cm lymph node with a fatty echogenic hilum and normal thin cortex Level 3: A 1.3 x 0.6 x 0.9 cm lymph node with a fatty echogenic hilum and normal thin cortex, an adjacent 1.8 x 0.4 1.4 cm lymph node with a fatty echogenic hilum and normal thin cortex. The submandibular and parotid glands are symmetric and homogeneous in appearance bilaterally. IMPRESSION: Palpable mass corresponds to a 5 6 cm vascular hypochoic solid mass lateral to the right submandibular gland. Neoplasm is not excluded. Recommend MRI of the neck without and with contrast for further evaluation.  4/25/24-CT: IMPRESSION Mildly lobulated ovoid soft tissue density positioned between the sternocleidomastoid, parotid, submandibular gland and carotid sheath on the right. Differential diagnosis includes parotid neoplasm, schwannoma, paraganglioma, metastasis among other etiologies. Biopsy can be performed as clinically indicated. Right lingual/palatine tonsil soft tissue prominence and protrusion with into the oropharynx measuring 3.1 x 1.9 x 3.6 cm suspicious neoplastic process without definite focal enhancement. Contact with the mucosal surface on the opposite side of the oropharynx. Prominent posterior nasopharyngeal soft tissue without definite focal lesion. Direct visualization is recommended. Biopsy can be performed as indicated. A few bilateral enlarged lymph nodes may reflect metastatic disease. Clinical correlation recommended. Status post left parietal craniotomy as well as a  shunt (done Dec 5. 2013). 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 Suboptimal evaluation of cerebral parenchyma secondary to streak artifact. Clinical correlation and comparison with prior imaging is recommended if available. MRI and/or MRA of the brain with and without contrast can be performed as clinically indicated.    5/9/24-MRI: Large right oropharyngeal/hypopharyngeal mass with right level 2 and level 3 adenoathy, with a dominant 5.5 cm x 4.5 cm right level 2 lymph node.  CT Soft Tissue Neck 5/16/24: IMPRESSION: Mildly lobulated ovoid soft tissue density positioned between the sternocleidomastoid, parotid, submandibular gland and carotid sheath on the right. Differential diagnosis includes parotid neoplasm, schwannoma, paraganglioma, metastasis among other etiologies. Biopsy can be performed as clinically indicated. Right lingual/palatine tonsil soft tissue prominence and protrusion with into the oropharynx measuring 3.1 x 1.9 x 3.6 cm suspicious neoplastic process without definite focal enhancement. Contact with the mucosal surface on the opposite side of the oropharynx. Prominent posterior nasopharyngeal soft tissue without definite focal lesion. Direct visualization is recommended. Biopsy can be performed as indicated. A few bilateral enlarged lymph nodes may reflect metastatic disease. Clinical correlation recommended. Status post left parietal craniotomy. 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 Suboptimal evaluation of cerebral parenchyma secondary to streak artifact. Clinical correlation and comparison with prior imaging is recommended if available. MRI and/or MRA of the brain with and without contrast can be performed as clinically indicated.   He first noted right neck mass about 3 months ago. He reports fluctuates in size. Experiences intermittent dysphonia since before mass was found. Denies dysphagia, dyspnea. Reports increasing congestion. Denies unintentional weight loss, night sweats, or chills. No smoking history. No cancer history in the family.  PET/CT done 6/3/24 IMPRESSION: Abnormal skull-to-thigh FDG-PET/CT scan. 1. FDG-avid soft tissue mass centered in right palatine tonsil, extending into right vallecula, piriform sinus, and abutting the epiglottis corresponds to patient's primary squamous cell carcinoma. 2. FDG-avid right cervical lymphadenopathy corresponds to biopsy-proven metastatic disease. A small FDG-avid right supraclavicular lymph node is suspicious for metastatic disease. Few small FDG-avid left cervical lymph nodes and right axillary node are nonspecific. Further evaluation may be performed with ultrasound and percutaneous needle biopsy, if indicated. 3. Left frontotemporal craniotomy with subadjacent postsurgical changes and aneurysm clips. There is a small photopenic low density area in the left frontal lobe which may represent cerebral infarction. Correlate clinically and with prior dedicated imaging of brain.  Interval history  7/19/24: Pt states he feels well. He notes that the neck mass is smaller. He denies any dysphagia/odynophagia/weight loss.   7/23/24: Patient seen today for follow up while receiving C3 of carbo with concurrent RT. He reports feeling overall well. Feels the mass on his neck is smaller and softer. Denies N/V/D/C, fever, chills, odynophagia.   8/2/24: Patient seen today for follow up while receiving IVF hydration. He is accompanied by his wife. He continues with RT. He reports feeling overall well. Continues to maintain PO intake, able to eat solid foods. Denies N/V/D/C, fever, neuropathy, pain.   8/13/24: Patient seen today for follow up. He continues on carbo and RT and is overall doing well. He has a small amount of throat discomfort but is still able to eat his usual foods and is maintaining his nutrition well. He denies F/C/N/V/D.    Disease: head and neck cancer   Pathology: sq cell ca   TNM stage: T4, N3, Mx AJCC Stage: III/IV   [de-identified] : sq cell ca

## 2024-08-14 ENCOUNTER — NON-APPOINTMENT (OUTPATIENT)
Age: 64
End: 2024-08-14

## 2024-08-14 VITALS
BODY MASS INDEX: 32.07 KG/M2 | OXYGEN SATURATION: 99 % | RESPIRATION RATE: 16 BRPM | HEART RATE: 63 BPM | HEIGHT: 71 IN | DIASTOLIC BLOOD PRESSURE: 63 MMHG | SYSTOLIC BLOOD PRESSURE: 99 MMHG | WEIGHT: 229.06 LBS

## 2024-08-14 PROCEDURE — 77387B: CUSTOM | Mod: 26

## 2024-08-14 NOTE — HISTORY OF PRESENT ILLNESS
[FreeTextEntry1] : 63 yr. 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 S/p biopsy on 5/7/2024.  Fine Needle Aspiration Report - Auth (Verified) specimen(s) Submitted LYMPH NODE, CERVICAL, RIGHT, L3, US GUIDED CORE BIOPSY AND FNA  Final Diagnosis LYMPH NODE, CERVICAL, RIGHT, L3, US GUIDED CORE BIOPSY AND FNA POSITIVE FOR MALIGNANT CELLS. Metastatic nonkeratinizing squamous cell carcinoma.  5/8/2024 MRI Neck: Impression: Large right oropharyngeal/hypopharyngeal mass with right level 2 and level 3 adenopathy, with a dominant 5.5cm x 4.5cm right level 2 lymph node.   6/3/2024 PETCT: IMPRESSION: Abnormal skull-to-thigh FDG-PET/CT scan. 1. FDG-avid soft tissue mass centered in right palatine tonsil, extending into right vallecula, piriform sinus, and abutting the epiglottis corresponds to patient's primary squamous cell carcinoma. 2. FDG-avid right cervical lymphadenopathy corresponds to biopsy-proven metastatic disease. A small FDG-avid right supraclavicular lymph node is suspicious for metastatic disease. Few small FDG-avid left cervical lymph nodes and right axillary node are nonspecific. Further evaluation may be performed with ultrasound and percutaneous needle biopsy, if indicated. 3. Left frontotemporal craniotomy with subadjacent postsurgical changes and aneurysm clips. There is a small photopenic low density area in the left frontal lobe which may represent cerebral infarction. Correlate clinically and with prior dedicated imaging of brain.  7/10/2024 Presents today for OTV. Completed 2/35 Fxs  to oropharynx/neck thus far doing well. Today w/o any verbalized concerns. Denies difficulty with swallowing eating regular foods w/o difficulty.  7/17/2024 OTV Completed fx 7/35. He states that he is doing well without symptoms at this time. He will being moisturizing the skin. He is tolerating PO intake without issues. No pain.  7/24/2024: Patient presents to clinic for OTV s/p 12/35fx of [id number] to the oropharynx/neck. Feeling well. Denies pain, trouble swallowing, Weight stable.   7/31/2024 OTV. Completed fx 17/35. Doing well. Maintaining weight. States has no taste. Using magic mouthwash with good pain control.  8/07/2024 Patient presents today for OTV s/p 22/35 fx. He has no taste. He has mucositis in the back of the oropharynx. He has discomfort when he eats and is using the magic mouthwash. 6 lb weight loss this week.   8/14/2024 OTV Completed fx 27/35.  in for OTV with his wife Brianna, they are concerned re: low BP, meds reviewed, suggested they f/u with PMD if continues with downward trends.   He has more discomfort from the skin than mucous membrane, skin noted to be hyperpigmented, peeling and dry.  reviewed the use of desitin as directed by Dr Quintero.

## 2024-08-14 NOTE — HISTORY OF PRESENT ILLNESS
[FreeTextEntry1] : 63 yr. 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 S/p biopsy on 5/7/2024.  Fine Needle Aspiration Report - Auth (Verified) specimen(s) Submitted LYMPH NODE, CERVICAL, RIGHT, L3, US GUIDED CORE BIOPSY AND FNA  Final Diagnosis LYMPH NODE, CERVICAL, RIGHT, L3, US GUIDED CORE BIOPSY AND FNA POSITIVE FOR MALIGNANT CELLS. Metastatic nonkeratinizing squamous cell carcinoma.  5/8/2024 MRI Neck: Impression: Large right oropharyngeal/hypopharyngeal mass with right level 2 and level 3 adenopathy, with a dominant 5.5cm x 4.5cm right level 2 lymph node.   6/3/2024 PETCT: IMPRESSION: Abnormal skull-to-thigh FDG-PET/CT scan. 1. FDG-avid soft tissue mass centered in right palatine tonsil, extending into right vallecula, piriform sinus, and abutting the epiglottis corresponds to patient's primary squamous cell carcinoma. 2. FDG-avid right cervical lymphadenopathy corresponds to biopsy-proven metastatic disease. A small FDG-avid right supraclavicular lymph node is suspicious for metastatic disease. Few small FDG-avid left cervical lymph nodes and right axillary node are nonspecific. Further evaluation may be performed with ultrasound and percutaneous needle biopsy, if indicated. 3. Left frontotemporal craniotomy with subadjacent postsurgical changes and aneurysm clips. There is a small photopenic low density area in the left frontal lobe which may represent cerebral infarction. Correlate clinically and with prior dedicated imaging of brain.  7/10/2024 Presents today for OTV. Completed 2/35 Fxs  to oropharynx/neck thus far doing well. Today w/o any verbalized concerns. Denies difficulty with swallowing eating regular foods w/o difficulty.  7/17/2024 OTV Completed fx 7/35. He states that he is doing well without symptoms at this time. He will being moisturizing the skin. He is tolerating PO intake without issues. No pain.  7/24/2024: Patient presents to clinic for OTV s/p 12/35fx of [id number] to the oropharynx/neck. Feeling well. Denies pain, trouble swallowing, Weight stable.   7/31/2024 OTV. Completed fx 17/35. Doing well. Maintaining weight. States has no taste. Using magic mouthwash with good pain control.  8/07/2024 Patient presents today for OTV s/p 22/35 fx. He has no taste. He has mucositis in the back of the oropharynx. He has discomfort when he eats and is using the magic mouthwash. 6 lb weight loss this week.   8/14/2024 OTV Completed fx 27/35.  in for OTV with his wife Brianna, they are concerned re: low BP, meds reviewed, suggested they f/u with PMD if continues with downward trends.   He has more discomfort from the skin than mucous membrane, skin noted to be hyperpigmented, peeling and dry.  reviewed the use of desitin as directed by Dr Quintero.

## 2024-08-14 NOTE — REVIEW OF SYSTEMS
[Dysphagia: Grade 1 - Symptomatic, able to eat regular diet] : Dysphagia: Grade 1 - Symptomatic, able to eat regular diet [Fatigue: Grade 1 - Fatigue relieved by rest] : Fatigue: Grade 1 - Fatigue relieved by rest [Oral Pain: Grade 0] : Oral Pain: Grade 0 [Dysgeusia: Grade 2 - Altered taste with change in diet (e.g., oral supplements); noxious or unpleasant taste; loss of taste] : Dysgeusia: Grade 2 - Altered taste with change in diet (e.g., oral supplements); noxious or unpleasant taste; loss of taste [Skin Hyperpigmentation: Grade 1 - Hyperpigmentation covering <10% BSA; no psychosocial impact] : Skin Hyperpigmentation: Grade 1 - Hyperpigmentation covering <10% BSA; no psychosocial impact [Dermatitis Radiation: Grade 1 - Faint erythema or dry desquamation] : Dermatitis Radiation: Grade 1 - Faint erythema or dry desquamation [FreeTextEntry5] : + neck mass RIGHT [de-identified] : tolerating regular consistency foods

## 2024-08-14 NOTE — DISEASE MANAGEMENT
[Clinical] : TNM Stage: c [ESTEE] : ESTEE [TTNM] : 4 [NTNM] : 1 [MTNM] : 0 [de-identified] : 3977 [de-identified] : 3824 [de-identified] : Oropharynx/neck

## 2024-08-14 NOTE — DISEASE MANAGEMENT
[Clinical] : TNM Stage: c [ESTEE] : ESTEE [TTNM] : 4 [NTNM] : 1 [MTNM] : 0 [de-identified] : 1796 [de-identified] : 1151 [de-identified] : Oropharynx/neck

## 2024-08-14 NOTE — VITALS
[Maximal Pain Intensity: 0/10] : 0/10 [90: Able to carry normal activity; minor signs or symptoms of disease.] : 90: Able to carry normal activity; minor signs or symptoms of disease.  [ECOG Performance Status: 0 - Fully active, able to carry on all pre-disease performance without restriction] : Performance Status: 0 - Fully active, able to carry on all pre-disease performance without restriction [Maximal Pain Intensity: 5/10] : 5/10 [Least Pain Intensity: 0/10] : 0/10 [Pain Description/Quality: ___] : Pain description/quality: [unfilled] [Pain Duration: ___] : Pain duration: [unfilled] [Pain Location: ___] : Pain Location: [unfilled] [OTC] : OTC [Pain Interferes with ADLs] : Pain does not interfere with activities of daily living

## 2024-08-14 NOTE — REVIEW OF SYSTEMS
[Dysphagia: Grade 1 - Symptomatic, able to eat regular diet] : Dysphagia: Grade 1 - Symptomatic, able to eat regular diet [Fatigue: Grade 1 - Fatigue relieved by rest] : Fatigue: Grade 1 - Fatigue relieved by rest [Oral Pain: Grade 0] : Oral Pain: Grade 0 [Dysgeusia: Grade 2 - Altered taste with change in diet (e.g., oral supplements); noxious or unpleasant taste; loss of taste] : Dysgeusia: Grade 2 - Altered taste with change in diet (e.g., oral supplements); noxious or unpleasant taste; loss of taste [Skin Hyperpigmentation: Grade 1 - Hyperpigmentation covering <10% BSA; no psychosocial impact] : Skin Hyperpigmentation: Grade 1 - Hyperpigmentation covering <10% BSA; no psychosocial impact [Dermatitis Radiation: Grade 1 - Faint erythema or dry desquamation] : Dermatitis Radiation: Grade 1 - Faint erythema or dry desquamation [FreeTextEntry5] : + neck mass RIGHT [de-identified] : tolerating regular consistency foods

## 2024-08-15 PROCEDURE — 77387B: CUSTOM | Mod: 26

## 2024-08-16 ENCOUNTER — APPOINTMENT (OUTPATIENT)
Dept: HEMATOLOGY ONCOLOGY | Facility: CLINIC | Age: 64
End: 2024-08-16

## 2024-08-16 ENCOUNTER — APPOINTMENT (OUTPATIENT)
Dept: INFUSION THERAPY | Facility: HOSPITAL | Age: 64
End: 2024-08-16

## 2024-08-16 PROCEDURE — 77387B: CUSTOM | Mod: 26

## 2024-08-19 ENCOUNTER — APPOINTMENT (OUTPATIENT)
Dept: OTOLARYNGOLOGY | Facility: CLINIC | Age: 64
End: 2024-08-19
Payer: COMMERCIAL

## 2024-08-19 ENCOUNTER — NON-APPOINTMENT (OUTPATIENT)
Age: 64
End: 2024-08-19

## 2024-08-19 PROCEDURE — 77014: CPT | Mod: 26

## 2024-08-19 PROCEDURE — 92526 ORAL FUNCTION THERAPY: CPT | Mod: GN

## 2024-08-20 ENCOUNTER — APPOINTMENT (OUTPATIENT)
Dept: HEMATOLOGY ONCOLOGY | Facility: CLINIC | Age: 64
End: 2024-08-20
Payer: COMMERCIAL

## 2024-08-20 ENCOUNTER — APPOINTMENT (OUTPATIENT)
Dept: INFUSION THERAPY | Facility: HOSPITAL | Age: 64
End: 2024-08-20

## 2024-08-20 ENCOUNTER — RESULT REVIEW (OUTPATIENT)
Age: 64
End: 2024-08-20

## 2024-08-20 LAB
ALBUMIN SERPL ELPH-MCNC: 3.8 G/DL — SIGNIFICANT CHANGE UP (ref 3.3–5)
ALP SERPL-CCNC: 66 U/L — SIGNIFICANT CHANGE UP (ref 40–120)
ALT FLD-CCNC: 11 U/L — SIGNIFICANT CHANGE UP (ref 10–45)
ANION GAP SERPL CALC-SCNC: 12 MMOL/L — SIGNIFICANT CHANGE UP (ref 5–17)
AST SERPL-CCNC: 15 U/L — SIGNIFICANT CHANGE UP (ref 10–40)
BASOPHILS # BLD AUTO: 0 K/UL — SIGNIFICANT CHANGE UP (ref 0–0.2)
BASOPHILS # BLD AUTO: 0.02 K/UL — SIGNIFICANT CHANGE UP (ref 0–0.2)
BASOPHILS NFR BLD AUTO: 0 % — SIGNIFICANT CHANGE UP (ref 0–2)
BASOPHILS NFR BLD AUTO: 0.7 % — SIGNIFICANT CHANGE UP (ref 0–2)
BILIRUB SERPL-MCNC: 1.1 MG/DL — SIGNIFICANT CHANGE UP (ref 0.2–1.2)
BUN SERPL-MCNC: 18 MG/DL — SIGNIFICANT CHANGE UP (ref 7–23)
CALCIUM SERPL-MCNC: 9.5 MG/DL — SIGNIFICANT CHANGE UP (ref 8.4–10.5)
CHLORIDE SERPL-SCNC: 104 MMOL/L — SIGNIFICANT CHANGE UP (ref 96–108)
CO2 SERPL-SCNC: 26 MMOL/L — SIGNIFICANT CHANGE UP (ref 22–31)
CREAT SERPL-MCNC: 1.2 MG/DL — SIGNIFICANT CHANGE UP (ref 0.5–1.3)
EGFR: 69 ML/MIN/1.73M2 — SIGNIFICANT CHANGE UP
EOSINOPHIL # BLD AUTO: 0.03 K/UL — SIGNIFICANT CHANGE UP (ref 0–0.5)
EOSINOPHIL # BLD AUTO: 0.04 K/UL — SIGNIFICANT CHANGE UP (ref 0–0.5)
EOSINOPHIL NFR BLD AUTO: 1.3 % — SIGNIFICANT CHANGE UP (ref 0–6)
EOSINOPHIL NFR BLD AUTO: 1.5 % — SIGNIFICANT CHANGE UP (ref 0–6)
GLUCOSE SERPL-MCNC: 117 MG/DL — HIGH (ref 70–99)
HCT VFR BLD CALC: 35.9 % — LOW (ref 39–50)
HCT VFR BLD CALC: 36.1 % — LOW (ref 39–50)
HGB BLD-MCNC: 12.5 G/DL — LOW (ref 13–17)
HGB BLD-MCNC: 12.9 G/DL — LOW (ref 13–17)
IMM GRANULOCYTES NFR BLD AUTO: 1 % — HIGH (ref 0–0.9)
IMM GRANULOCYTES NFR BLD AUTO: 2 % — HIGH (ref 0–0.9)
LYMPHOCYTES # BLD AUTO: 0.12 K/UL — LOW (ref 1–3.3)
LYMPHOCYTES # BLD AUTO: 0.19 K/UL — LOW (ref 1–3.3)
LYMPHOCYTES # BLD AUTO: 6 % — LOW (ref 13–44)
LYMPHOCYTES # BLD AUTO: 6.3 % — LOW (ref 13–44)
MAGNESIUM SERPL-MCNC: 2.5 MG/DL — SIGNIFICANT CHANGE UP (ref 1.6–2.6)
MCHC RBC-ENTMCNC: 33.4 PG — SIGNIFICANT CHANGE UP (ref 27–34)
MCHC RBC-ENTMCNC: 34 PG — SIGNIFICANT CHANGE UP (ref 27–34)
MCHC RBC-ENTMCNC: 34.8 G/DL — SIGNIFICANT CHANGE UP (ref 32–36)
MCHC RBC-ENTMCNC: 35.7 G/DL — SIGNIFICANT CHANGE UP (ref 32–36)
MCV RBC AUTO: 95.3 FL — SIGNIFICANT CHANGE UP (ref 80–100)
MCV RBC AUTO: 96 FL — SIGNIFICANT CHANGE UP (ref 80–100)
MONOCYTES # BLD AUTO: 0.41 K/UL — SIGNIFICANT CHANGE UP (ref 0–0.9)
MONOCYTES # BLD AUTO: 0.61 K/UL — SIGNIFICANT CHANGE UP (ref 0–0.9)
MONOCYTES NFR BLD AUTO: 20.1 % — HIGH (ref 2–14)
MONOCYTES NFR BLD AUTO: 20.4 % — HIGH (ref 2–14)
NEUTROPHILS # BLD AUTO: 1.43 K/UL — LOW (ref 1.8–7.4)
NEUTROPHILS # BLD AUTO: 2.11 K/UL — SIGNIFICANT CHANGE UP (ref 1.8–7.4)
NEUTROPHILS NFR BLD AUTO: 69.6 % — SIGNIFICANT CHANGE UP (ref 43–77)
NEUTROPHILS NFR BLD AUTO: 71.1 % — SIGNIFICANT CHANGE UP (ref 43–77)
NRBC # BLD: 0 /100 WBCS — SIGNIFICANT CHANGE UP (ref 0–0)
NRBC # BLD: 0 /100 WBCS — SIGNIFICANT CHANGE UP (ref 0–0)
PLATELET # BLD AUTO: 160 K/UL — SIGNIFICANT CHANGE UP (ref 150–400)
PLATELET # BLD AUTO: 166 K/UL — SIGNIFICANT CHANGE UP (ref 150–400)
POTASSIUM SERPL-MCNC: 3.6 MMOL/L — SIGNIFICANT CHANGE UP (ref 3.5–5.3)
POTASSIUM SERPL-SCNC: 3.6 MMOL/L — SIGNIFICANT CHANGE UP (ref 3.5–5.3)
PROT SERPL-MCNC: 7.3 G/DL — SIGNIFICANT CHANGE UP (ref 6–8.3)
RBC # BLD: 3.74 M/UL — LOW (ref 4.2–5.8)
RBC # BLD: 3.79 M/UL — LOW (ref 4.2–5.8)
RBC # FLD: 14.7 % — HIGH (ref 10.3–14.5)
RBC # FLD: 14.8 % — HIGH (ref 10.3–14.5)
SODIUM SERPL-SCNC: 142 MMOL/L — SIGNIFICANT CHANGE UP (ref 135–145)
T4 FREE+ TSH PNL SERPL: 0.44 UIU/ML — SIGNIFICANT CHANGE UP (ref 0.27–4.2)
WBC # BLD: 2.01 K/UL — LOW (ref 3.8–10.5)
WBC # BLD: 3.03 K/UL — LOW (ref 3.8–10.5)
WBC # FLD AUTO: 2.01 K/UL — LOW (ref 3.8–10.5)
WBC # FLD AUTO: 3.03 K/UL — LOW (ref 3.8–10.5)

## 2024-08-20 PROCEDURE — 77387B: CUSTOM | Mod: 26

## 2024-08-20 PROCEDURE — 99214 OFFICE O/P EST MOD 30 MIN: CPT

## 2024-08-20 PROCEDURE — 77427 RADIATION TX MANAGEMENT X5: CPT

## 2024-08-20 NOTE — ASSESSMENT
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 Therefore, pt was started on chemo and RT.  Carboplatin was chosen because of baseline elevated creatinine at 1.3. He started this chemo on July 10th 2024. Creatinine has improved to normal levels now. Pt has been receiving IV hydration weekly and this may been instrumental in normalizing renal function.   Plan:  -Proceed with C7 carbo. This is his last cycle.  -Plan for IVF hydration 8/27 and 8/30 -Continue RT. Plan for a total of 35 frx to oropharynx/neck. He is planned to complete RT 8/26 -Radiation dermatitis: continue applying Desitin  -We reiterated the importance of timely reporting of AEs -Nutrition follow up OV with each chemo

## 2024-08-20 NOTE — PHYSICAL EXAM
[Fully active, able to carry on all pre-disease performance without restriction] : Status 0 - Fully active, able to carry on all pre-disease performance without restriction [Normal] : affect appropriate [de-identified] : rt neck mass remarkably smaller. Skin desquamation 2/2 RT

## 2024-08-20 NOTE — HISTORY OF PRESENT ILLNESS
[Disease: _____________________] : Disease: [unfilled] [de-identified] : 63-year-old male presents for consult to med onc for head and neck sq cell ca  Started with noticing lump rt neck 2 months ago, grew rapidly, went to PCP and then ENT FNA from 5/7 showed pathology consistent with Metastatic nonkeratinizing squamous cell carcinoma. P16 strongly  4/11/24: US FINDINGS: Palpable mass on the right side of the neck lateral to the right submandibular gland corresponds to a 4.2 x 5.6 cm vascular heterogeneous predominately hypoechoic solid mass. Cervical lymph nodes are seen as follows: On the right: Level 3: A 1.8 x 0.8 x 1.7 cm lymph node with a fatty echogenic hilum and normal thin cortex, an adjacent 1.5 x 0.6 1.8 cm lymph node with a fatty echogenic hilum and normal thin cortex, an adjacent 1.8 x 0.6 x 1.6 cm lymph node a fatty echogenic hilum and normal thin cortex, an adjacent 1.1 x 0.9 x 1.2 cm lymph node with a fatty echogenic h and normal thin cortex Level 4: A 1.7 x 0.8 x 0.8 cm lymph node with a fatty echogenic hilum and normal thin cortex On the left: Level 2: A 2.5 x 1.2 x 1.6 cm lymph node with a fatty echogenic hilum and normal thin cortex Level 3: A 1.3 x 0.6 x 0.9 cm lymph node with a fatty echogenic hilum and normal thin cortex, an adjacent 1.8 x 0.4 1.4 cm lymph node with a fatty echogenic hilum and normal thin cortex. The submandibular and parotid glands are symmetric and homogeneous in appearance bilaterally. IMPRESSION: Palpable mass corresponds to a 5 6 cm vascular hypochoic solid mass lateral to the right submandibular gland. Neoplasm is not excluded. Recommend MRI of the neck without and with contrast for further evaluation.  4/25/24-CT: IMPRESSION Mildly lobulated ovoid soft tissue density positioned between the sternocleidomastoid, parotid, submandibular gland and carotid sheath on the right. Differential diagnosis includes parotid neoplasm, schwannoma, paraganglioma, metastasis among other etiologies. Biopsy can be performed as clinically indicated. Right lingual/palatine tonsil soft tissue prominence and protrusion with into the oropharynx measuring 3.1 x 1.9 x 3.6 cm suspicious neoplastic process without definite focal enhancement. Contact with the mucosal surface on the opposite side of the oropharynx. Prominent posterior nasopharyngeal soft tissue without definite focal lesion. Direct visualization is recommended. Biopsy can be performed as indicated. A few bilateral enlarged lymph nodes may reflect metastatic disease. Clinical correlation recommended. Status post left parietal craniotomy as well as a  shunt (done Dec 5. 2013). 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 Suboptimal evaluation of cerebral parenchyma secondary to streak artifact. Clinical correlation and comparison with prior imaging is recommended if available. MRI and/or MRA of the brain with and without contrast can be performed as clinically indicated.    5/9/24-MRI: Large right oropharyngeal/hypopharyngeal mass with right level 2 and level 3 adenoathy, with a dominant 5.5 cm x 4.5 cm right level 2 lymph node.  CT Soft Tissue Neck 5/16/24: IMPRESSION: Mildly lobulated ovoid soft tissue density positioned between the sternocleidomastoid, parotid, submandibular gland and carotid sheath on the right. Differential diagnosis includes parotid neoplasm, schwannoma, paraganglioma, metastasis among other etiologies. Biopsy can be performed as clinically indicated. Right lingual/palatine tonsil soft tissue prominence and protrusion with into the oropharynx measuring 3.1 x 1.9 x 3.6 cm suspicious neoplastic process without definite focal enhancement. Contact with the mucosal surface on the opposite side of the oropharynx. Prominent posterior nasopharyngeal soft tissue without definite focal lesion. Direct visualization is recommended. Biopsy can be performed as indicated. A few bilateral enlarged lymph nodes may reflect metastatic disease. Clinical correlation recommended. Status post left parietal craniotomy. 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 Suboptimal evaluation of cerebral parenchyma secondary to streak artifact. Clinical correlation and comparison with prior imaging is recommended if available. MRI and/or MRA of the brain with and without contrast can be performed as clinically indicated.   He first noted right neck mass about 3 months ago. He reports fluctuates in size. Experiences intermittent dysphonia since before mass was found. Denies dysphagia, dyspnea. Reports increasing congestion. Denies unintentional weight loss, night sweats, or chills. No smoking history. No cancer history in the family.  PET/CT done 6/3/24 IMPRESSION: Abnormal skull-to-thigh FDG-PET/CT scan. 1. FDG-avid soft tissue mass centered in right palatine tonsil, extending into right vallecula, piriform sinus, and abutting the epiglottis corresponds to patient's primary squamous cell carcinoma. 2. FDG-avid right cervical lymphadenopathy corresponds to biopsy-proven metastatic disease. A small FDG-avid right supraclavicular lymph node is suspicious for metastatic disease. Few small FDG-avid left cervical lymph nodes and right axillary node are nonspecific. Further evaluation may be performed with ultrasound and percutaneous needle biopsy, if indicated. 3. Left frontotemporal craniotomy with subadjacent postsurgical changes and aneurysm clips. There is a small photopenic low density area in the left frontal lobe which may represent cerebral infarction. Correlate clinically and with prior dedicated imaging of brain.  Interval history  7/19/24: Pt states he feels well. He notes that the neck mass is smaller. He denies any dysphagia/odynophagia/weight loss.   7/23/24: Patient seen today for follow up while receiving C3 of carbo with concurrent RT. He reports feeling overall well. Feels the mass on his neck is smaller and softer. Denies N/V/D/C, fever, chills, odynophagia.   8/2/24: Patient seen today for follow up while receiving IVF hydration. He is accompanied by his wife. He continues with RT. He reports feeling overall well. Continues to maintain PO intake, able to eat solid foods. Denies N/V/D/C, fever, neuropathy, pain.   8/13/24: Patient seen today for follow up. He continues on carbo and RT and is overall doing well. He has a small amount of throat discomfort but is still able to eat his usual foods and is maintaining his nutrition well. He denies F/C/N/V/D.   8/20/24: Patient seen today for follow up accompanied by his wife. He is here for C7 carbo. They report that he has ongoing skin desquamation on his neck due to radiation and is applying desitin. He has some throat discomfort but is still able to eat well. Denies F/C/N/V/D.    Disease: head and neck cancer   Pathology: sq cell ca   TNM stage: T4, N3, Mx AJCC Stage: III/IV   [de-identified] : sq cell ca

## 2024-08-21 ENCOUNTER — NON-APPOINTMENT (OUTPATIENT)
Age: 64
End: 2024-08-21

## 2024-08-21 VITALS
SYSTOLIC BLOOD PRESSURE: 107 MMHG | WEIGHT: 223.66 LBS | HEART RATE: 80 BPM | RESPIRATION RATE: 16 BRPM | OXYGEN SATURATION: 96 % | BODY MASS INDEX: 31.19 KG/M2 | DIASTOLIC BLOOD PRESSURE: 69 MMHG

## 2024-08-21 PROCEDURE — 77387B: CUSTOM | Mod: 26

## 2024-08-21 NOTE — REVIEW OF SYSTEMS
[Dysphagia: Grade 1 - Symptomatic, able to eat regular diet] : Dysphagia: Grade 1 - Symptomatic, able to eat regular diet [Fatigue: Grade 1 - Fatigue relieved by rest] : Fatigue: Grade 1 - Fatigue relieved by rest [Oral Pain: Grade 0] : Oral Pain: Grade 0 [Dysgeusia: Grade 2 - Altered taste with change in diet (e.g., oral supplements); noxious or unpleasant taste; loss of taste] : Dysgeusia: Grade 2 - Altered taste with change in diet (e.g., oral supplements); noxious or unpleasant taste; loss of taste [Skin Hyperpigmentation: Grade 1 - Hyperpigmentation covering <10% BSA; no psychosocial impact] : Skin Hyperpigmentation: Grade 1 - Hyperpigmentation covering <10% BSA; no psychosocial impact [Dermatitis Radiation: Grade 1 - Faint erythema or dry desquamation] : Dermatitis Radiation: Grade 1 - Faint erythema or dry desquamation [FreeTextEntry5] : + neck mass RIGHT [Mucositis Oral: Grade 0] : Mucositis Oral: Grade 0  [Xerostomia: Grade 0] : Xerostomia: Grade 0 [de-identified] : tolerating regular consistency foods [Dermatitis Radiation: Grade 2 - Moderate to brisk erythema; patchy moist desquamation, mostly confined to skin folds and creases; moderate edema] : Dermatitis Radiation: Grade 2 - Moderate to brisk erythema; patchy moist desquamation, mostly confined to skin folds and creases; moderate edema

## 2024-08-21 NOTE — HISTORY OF PRESENT ILLNESS
[FreeTextEntry1] : 63 yr. 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 S/p biopsy on 5/7/2024.  Fine Needle Aspiration Report - Auth (Verified) specimen(s) Submitted LYMPH NODE, CERVICAL, RIGHT, L3, US GUIDED CORE BIOPSY AND FNA  Final Diagnosis LYMPH NODE, CERVICAL, RIGHT, L3, US GUIDED CORE BIOPSY AND FNA POSITIVE FOR MALIGNANT CELLS. Metastatic nonkeratinizing squamous cell carcinoma.  5/8/2024 MRI Neck: Impression: Large right oropharyngeal/hypopharyngeal mass with right level 2 and level 3 adenopathy, with a dominant 5.5cm x 4.5cm right level 2 lymph node.   6/3/2024 PETCT: IMPRESSION: Abnormal skull-to-thigh FDG-PET/CT scan. 1. FDG-avid soft tissue mass centered in right palatine tonsil, extending into right vallecula, piriform sinus, and abutting the epiglottis corresponds to patient's primary squamous cell carcinoma. 2. FDG-avid right cervical lymphadenopathy corresponds to biopsy-proven metastatic disease. A small FDG-avid right supraclavicular lymph node is suspicious for metastatic disease. Few small FDG-avid left cervical lymph nodes and right axillary node are nonspecific. Further evaluation may be performed with ultrasound and percutaneous needle biopsy, if indicated. 3. Left frontotemporal craniotomy with subadjacent postsurgical changes and aneurysm clips. There is a small photopenic low density area in the left frontal lobe which may represent cerebral infarction. Correlate clinically and with prior dedicated imaging of brain.  7/10/2024 Presents today for OTV. Completed 2/35 Fxs  to oropharynx/neck thus far doing well. Today w/o any verbalized concerns. Denies difficulty with swallowing eating regular foods w/o difficulty.  7/17/2024 OTV Completed fx 7/35. He states that he is doing well without symptoms at this time. He will being moisturizing the skin. He is tolerating PO intake without issues. No pain.  7/24/2024: Patient presents to clinic for OTV s/p 12/35fx of [id number] to the oropharynx/neck. Feeling well. Denies pain, trouble swallowing, Weight stable.   7/31/2024 OTV. Completed fx 17/35. Doing well. Maintaining weight. States has no taste. Using magic mouthwash with good pain control.  8/07/2024 Patient presents today for OTV s/p 22/35 fx. He has no taste. He has mucositis in the back of the oropharynx. He has discomfort when he eats and is using the magic mouthwash. 6 lb weight loss this week.   8/14/2024 OTV Completed fx 27/35.  in for OTV with his wife Brianna, they are concerned re: low BP, meds reviewed, suggested they f/u with PMD if continues with downward trends.   He has more discomfort from the skin than mucous membrane, skin noted to be hyperpigmented, peeling and dry.  reviewed the use of desitin as directed by Dr Quintero.    8/21/2024 OTV Completed fx 32/35. His skin shows radiation dermatitis, using desitin at this time. He is using magic mouthwash, occasional tylenol for pain. He is eating by mouth still. No dry mouth. He continues to using baking soda, water rinses.

## 2024-08-21 NOTE — HISTORY OF PRESENT ILLNESS
[FreeTextEntry1] : 63 yr. 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 S/p biopsy on 5/7/2024.  Fine Needle Aspiration Report - Auth (Verified) specimen(s) Submitted LYMPH NODE, CERVICAL, RIGHT, L3, US GUIDED CORE BIOPSY AND FNA  Final Diagnosis LYMPH NODE, CERVICAL, RIGHT, L3, US GUIDED CORE BIOPSY AND FNA POSITIVE FOR MALIGNANT CELLS. Metastatic nonkeratinizing squamous cell carcinoma.  5/8/2024 MRI Neck: Impression: Large right oropharyngeal/hypopharyngeal mass with right level 2 and level 3 adenopathy, with a dominant 5.5cm x 4.5cm right level 2 lymph node.   6/3/2024 PETCT: IMPRESSION: Abnormal skull-to-thigh FDG-PET/CT scan. 1. FDG-avid soft tissue mass centered in right palatine tonsil, extending into right vallecula, piriform sinus, and abutting the epiglottis corresponds to patient's primary squamous cell carcinoma. 2. FDG-avid right cervical lymphadenopathy corresponds to biopsy-proven metastatic disease. A small FDG-avid right supraclavicular lymph node is suspicious for metastatic disease. Few small FDG-avid left cervical lymph nodes and right axillary node are nonspecific. Further evaluation may be performed with ultrasound and percutaneous needle biopsy, if indicated. 3. Left frontotemporal craniotomy with subadjacent postsurgical changes and aneurysm clips. There is a small photopenic low density area in the left frontal lobe which may represent cerebral infarction. Correlate clinically and with prior dedicated imaging of brain.  7/10/2024 Presents today for OTV. Completed 2/35 Fxs  to oropharynx/neck thus far doing well. Today w/o any verbalized concerns. Denies difficulty with swallowing eating regular foods w/o difficulty.  7/17/2024 OTV Completed fx 7/35. He states that he is doing well without symptoms at this time. He will being moisturizing the skin. He is tolerating PO intake without issues. No pain.  7/24/2024: Patient presents to clinic for OTV s/p 12/35fx of [id number] to the oropharynx/neck. Feeling well. Denies pain, trouble swallowing, Weight stable.   7/31/2024 OTV. Completed fx 17/35. Doing well. Maintaining weight. States has no taste. Using magic mouthwash with good pain control.  8/07/2024 Patient presents today for OTV s/p 22/35 fx. He has no taste. He has mucositis in the back of the oropharynx. He has discomfort when he eats and is using the magic mouthwash. 6 lb weight loss this week.   8/14/2024 OTV Completed fx 27/35.  in for OTV with his wife Brianna, they are concerned re: low BP, meds reviewed, suggested they f/u with PMD if continues with downward trends.   He has more discomfort from the skin than mucous membrane, skin noted to be hyperpigmented, peeling and dry.  reviewed the use of desitin as directed by Dr Quintero.    8/21/2024 OTV Completed fx 32/35. His skin shows radiation dermatitis, using desitin at this time. He is using magic mouthwash, occasional tylenol for pain. He is eating by mouth still. No dry mouth. He continues to using baking soda, water rinses.

## 2024-08-21 NOTE — VITALS
[Maximal Pain Intensity: 5/10] : 5/10 [Least Pain Intensity: 0/10] : 0/10 [Pain Description/Quality: ___] : Pain description/quality: [unfilled] [Pain Duration: ___] : Pain duration: [unfilled] [Pain Location: ___] : Pain Location: [unfilled] [OTC] : OTC [90: Able to carry normal activity; minor signs or symptoms of disease.] : 90: Able to carry normal activity; minor signs or symptoms of disease.  [ECOG Performance Status: 0 - Fully active, able to carry on all pre-disease performance without restriction] : Performance Status: 0 - Fully active, able to carry on all pre-disease performance without restriction [Pain Interferes with ADLs] : Pain does not interfere with activities of daily living

## 2024-08-21 NOTE — DISEASE MANAGEMENT
[Clinical] : TNM Stage: c [ESTEE] : ESTEE [TTNM] : 4 [NTNM] : 1 [MTNM] : 0 [de-identified] : 2332 [de-identified] : 1178 [de-identified] : Oropharynx/neck

## 2024-08-21 NOTE — DISEASE MANAGEMENT
[Clinical] : TNM Stage: c [ESTEE] : ESTEE [TTNM] : 4 [NTNM] : 1 [MTNM] : 0 [de-identified] : 3782 [de-identified] : 2792 [de-identified] : Oropharynx/neck

## 2024-08-21 NOTE — REVIEW OF SYSTEMS
[Dysphagia: Grade 1 - Symptomatic, able to eat regular diet] : Dysphagia: Grade 1 - Symptomatic, able to eat regular diet [Fatigue: Grade 1 - Fatigue relieved by rest] : Fatigue: Grade 1 - Fatigue relieved by rest [Oral Pain: Grade 0] : Oral Pain: Grade 0 [Dysgeusia: Grade 2 - Altered taste with change in diet (e.g., oral supplements); noxious or unpleasant taste; loss of taste] : Dysgeusia: Grade 2 - Altered taste with change in diet (e.g., oral supplements); noxious or unpleasant taste; loss of taste [Skin Hyperpigmentation: Grade 1 - Hyperpigmentation covering <10% BSA; no psychosocial impact] : Skin Hyperpigmentation: Grade 1 - Hyperpigmentation covering <10% BSA; no psychosocial impact [Dermatitis Radiation: Grade 1 - Faint erythema or dry desquamation] : Dermatitis Radiation: Grade 1 - Faint erythema or dry desquamation [FreeTextEntry5] : + neck mass RIGHT [Mucositis Oral: Grade 0] : Mucositis Oral: Grade 0  [Xerostomia: Grade 0] : Xerostomia: Grade 0 [de-identified] : tolerating regular consistency foods [Dermatitis Radiation: Grade 2 - Moderate to brisk erythema; patchy moist desquamation, mostly confined to skin folds and creases; moderate edema] : Dermatitis Radiation: Grade 2 - Moderate to brisk erythema; patchy moist desquamation, mostly confined to skin folds and creases; moderate edema

## 2024-08-22 PROCEDURE — 77387B: CUSTOM | Mod: 26

## 2024-08-23 ENCOUNTER — APPOINTMENT (OUTPATIENT)
Dept: HEMATOLOGY ONCOLOGY | Facility: CLINIC | Age: 64
End: 2024-08-23

## 2024-08-23 ENCOUNTER — APPOINTMENT (OUTPATIENT)
Dept: INFUSION THERAPY | Facility: HOSPITAL | Age: 64
End: 2024-08-23

## 2024-08-23 PROCEDURE — 77387B: CUSTOM | Mod: 26

## 2024-08-26 PROCEDURE — 77014: CPT | Mod: 26

## 2024-08-27 ENCOUNTER — RESULT REVIEW (OUTPATIENT)
Age: 64
End: 2024-08-27

## 2024-08-27 ENCOUNTER — APPOINTMENT (OUTPATIENT)
Dept: HEMATOLOGY ONCOLOGY | Facility: CLINIC | Age: 64
End: 2024-08-27
Payer: COMMERCIAL

## 2024-08-27 ENCOUNTER — APPOINTMENT (OUTPATIENT)
Dept: INFUSION THERAPY | Facility: HOSPITAL | Age: 64
End: 2024-08-27

## 2024-08-27 ENCOUNTER — APPOINTMENT (OUTPATIENT)
Dept: HEMATOLOGY ONCOLOGY | Facility: CLINIC | Age: 64
End: 2024-08-27

## 2024-08-27 DIAGNOSIS — C10.9 MALIGNANT NEOPLASM OF OROPHARYNX, UNSPECIFIED: ICD-10-CM

## 2024-08-27 LAB
ANION GAP SERPL CALC-SCNC: 9 MMOL/L — SIGNIFICANT CHANGE UP (ref 5–17)
BASOPHILS # BLD AUTO: 0.01 K/UL — SIGNIFICANT CHANGE UP (ref 0–0.2)
BASOPHILS NFR BLD AUTO: 0.4 % — SIGNIFICANT CHANGE UP (ref 0–2)
BUN SERPL-MCNC: 23 MG/DL — SIGNIFICANT CHANGE UP (ref 7–23)
CALCIUM SERPL-MCNC: 9.8 MG/DL — SIGNIFICANT CHANGE UP (ref 8.4–10.5)
CHLORIDE SERPL-SCNC: 105 MMOL/L — SIGNIFICANT CHANGE UP (ref 96–108)
CO2 SERPL-SCNC: 29 MMOL/L — SIGNIFICANT CHANGE UP (ref 22–31)
CREAT SERPL-MCNC: 1.26 MG/DL — SIGNIFICANT CHANGE UP (ref 0.5–1.3)
EGFR: 65 ML/MIN/1.73M2 — SIGNIFICANT CHANGE UP
EOSINOPHIL # BLD AUTO: 0.01 K/UL — SIGNIFICANT CHANGE UP (ref 0–0.5)
EOSINOPHIL NFR BLD AUTO: 0.4 % — SIGNIFICANT CHANGE UP (ref 0–6)
GLUCOSE SERPL-MCNC: 107 MG/DL — HIGH (ref 70–99)
HCT VFR BLD CALC: 34.7 % — LOW (ref 39–50)
HGB BLD-MCNC: 12 G/DL — LOW (ref 13–17)
IMM GRANULOCYTES NFR BLD AUTO: 0.9 % — SIGNIFICANT CHANGE UP (ref 0–0.9)
LYMPHOCYTES # BLD AUTO: 0.14 K/UL — LOW (ref 1–3.3)
LYMPHOCYTES # BLD AUTO: 6.3 % — LOW (ref 13–44)
MAGNESIUM SERPL-MCNC: 2.4 MG/DL — SIGNIFICANT CHANGE UP (ref 1.6–2.6)
MCHC RBC-ENTMCNC: 34.1 PG — HIGH (ref 27–34)
MCHC RBC-ENTMCNC: 34.6 G/DL — SIGNIFICANT CHANGE UP (ref 32–36)
MCV RBC AUTO: 98.6 FL — SIGNIFICANT CHANGE UP (ref 80–100)
MONOCYTES # BLD AUTO: 0.52 K/UL — SIGNIFICANT CHANGE UP (ref 0–0.9)
MONOCYTES NFR BLD AUTO: 23.2 % — HIGH (ref 2–14)
NEUTROPHILS # BLD AUTO: 1.54 K/UL — LOW (ref 1.8–7.4)
NEUTROPHILS NFR BLD AUTO: 68.8 % — SIGNIFICANT CHANGE UP (ref 43–77)
NRBC # BLD: 0 /100 WBCS — SIGNIFICANT CHANGE UP (ref 0–0)
PLATELET # BLD AUTO: 225 K/UL — SIGNIFICANT CHANGE UP (ref 150–400)
POTASSIUM SERPL-MCNC: 3.6 MMOL/L — SIGNIFICANT CHANGE UP (ref 3.5–5.3)
POTASSIUM SERPL-SCNC: 3.6 MMOL/L — SIGNIFICANT CHANGE UP (ref 3.5–5.3)
RBC # BLD: 3.52 M/UL — LOW (ref 4.2–5.8)
RBC # FLD: 15 % — HIGH (ref 10.3–14.5)
SODIUM SERPL-SCNC: 142 MMOL/L — SIGNIFICANT CHANGE UP (ref 135–145)
WBC # BLD: 2.24 K/UL — LOW (ref 3.8–10.5)
WBC # FLD AUTO: 2.24 K/UL — LOW (ref 3.8–10.5)

## 2024-08-27 PROCEDURE — 99204 OFFICE O/P NEW MOD 45 MIN: CPT

## 2024-08-27 NOTE — HISTORY OF PRESENT ILLNESS
[Disease: _____________________] : Disease: [unfilled] [de-identified] : 63-year-old male presents for consult to med onc for head and neck sq cell ca  Started with noticing lump rt neck 2 months ago, grew rapidly, went to PCP and then ENT FNA from 5/7 showed pathology consistent with Metastatic nonkeratinizing squamous cell carcinoma. P16 strongly  4/11/24: US FINDINGS: Palpable mass on the right side of the neck lateral to the right submandibular gland corresponds to a 4.2 x 5.6 cm vascular heterogeneous predominately hypoechoic solid mass. Cervical lymph nodes are seen as follows: On the right: Level 3: A 1.8 x 0.8 x 1.7 cm lymph node with a fatty echogenic hilum and normal thin cortex, an adjacent 1.5 x 0.6 1.8 cm lymph node with a fatty echogenic hilum and normal thin cortex, an adjacent 1.8 x 0.6 x 1.6 cm lymph node a fatty echogenic hilum and normal thin cortex, an adjacent 1.1 x 0.9 x 1.2 cm lymph node with a fatty echogenic h and normal thin cortex Level 4: A 1.7 x 0.8 x 0.8 cm lymph node with a fatty echogenic hilum and normal thin cortex On the left: Level 2: A 2.5 x 1.2 x 1.6 cm lymph node with a fatty echogenic hilum and normal thin cortex Level 3: A 1.3 x 0.6 x 0.9 cm lymph node with a fatty echogenic hilum and normal thin cortex, an adjacent 1.8 x 0.4 1.4 cm lymph node with a fatty echogenic hilum and normal thin cortex. The submandibular and parotid glands are symmetric and homogeneous in appearance bilaterally. IMPRESSION: Palpable mass corresponds to a 5 6 cm vascular hypochoic solid mass lateral to the right submandibular gland. Neoplasm is not excluded. Recommend MRI of the neck without and with contrast for further evaluation.  4/25/24-CT: IMPRESSION Mildly lobulated ovoid soft tissue density positioned between the sternocleidomastoid, parotid, submandibular gland and carotid sheath on the right. Differential diagnosis includes parotid neoplasm, schwannoma, paraganglioma, metastasis among other etiologies. Biopsy can be performed as clinically indicated. Right lingual/palatine tonsil soft tissue prominence and protrusion with into the oropharynx measuring 3.1 x 1.9 x 3.6 cm suspicious neoplastic process without definite focal enhancement. Contact with the mucosal surface on the opposite side of the oropharynx. Prominent posterior nasopharyngeal soft tissue without definite focal lesion. Direct visualization is recommended. Biopsy can be performed as indicated. A few bilateral enlarged lymph nodes may reflect metastatic disease. Clinical correlation recommended. Status post left parietal craniotomy as well as a  shunt (done Dec 5. 2013). 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 Suboptimal evaluation of cerebral parenchyma secondary to streak artifact. Clinical correlation and comparison with prior imaging is recommended if available. MRI and/or MRA of the brain with and without contrast can be performed as clinically indicated.    5/9/24-MRI: Large right oropharyngeal/hypopharyngeal mass with right level 2 and level 3 adenoathy, with a dominant 5.5 cm x 4.5 cm right level 2 lymph node.  CT Soft Tissue Neck 5/16/24: IMPRESSION: Mildly lobulated ovoid soft tissue density positioned between the sternocleidomastoid, parotid, submandibular gland and carotid sheath on the right. Differential diagnosis includes parotid neoplasm, schwannoma, paraganglioma, metastasis among other etiologies. Biopsy can be performed as clinically indicated. Right lingual/palatine tonsil soft tissue prominence and protrusion with into the oropharynx measuring 3.1 x 1.9 x 3.6 cm suspicious neoplastic process without definite focal enhancement. Contact with the mucosal surface on the opposite side of the oropharynx. Prominent posterior nasopharyngeal soft tissue without definite focal lesion. Direct visualization is recommended. Biopsy can be performed as indicated. A few bilateral enlarged lymph nodes may reflect metastatic disease. Clinical correlation recommended. Status post left parietal craniotomy. 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 Suboptimal evaluation of cerebral parenchyma secondary to streak artifact. Clinical correlation and comparison with prior imaging is recommended if available. MRI and/or MRA of the brain with and without contrast can be performed as clinically indicated.   He first noted right neck mass about 3 months ago. He reports fluctuates in size. Experiences intermittent dysphonia since before mass was found. Denies dysphagia, dyspnea. Reports increasing congestion. Denies unintentional weight loss, night sweats, or chills. No smoking history. No cancer history in the family.  PET/CT done 6/3/24 IMPRESSION: Abnormal skull-to-thigh FDG-PET/CT scan. 1. FDG-avid soft tissue mass centered in right palatine tonsil, extending into right vallecula, piriform sinus, and abutting the epiglottis corresponds to patient's primary squamous cell carcinoma. 2. FDG-avid right cervical lymphadenopathy corresponds to biopsy-proven metastatic disease. A small FDG-avid right supraclavicular lymph node is suspicious for metastatic disease. Few small FDG-avid left cervical lymph nodes and right axillary node are nonspecific. Further evaluation may be performed with ultrasound and percutaneous needle biopsy, if indicated. 3. Left frontotemporal craniotomy with subadjacent postsurgical changes and aneurysm clips. There is a small photopenic low density area in the left frontal lobe which may represent cerebral infarction. Correlate clinically and with prior dedicated imaging of brain.  Interval history  7/19/24: Pt states he feels well. He notes that the neck mass is smaller. He denies any dysphagia/odynophagia/weight loss.   7/23/24: Patient seen today for follow up while receiving C3 of carbo with concurrent RT. He reports feeling overall well. Feels the mass on his neck is smaller and softer. Denies N/V/D/C, fever, chills, odynophagia.   8/2/24: Patient seen today for follow up while receiving IVF hydration. He is accompanied by his wife. He continues with RT. He reports feeling overall well. Continues to maintain PO intake, able to eat solid foods. Denies N/V/D/C, fever, neuropathy, pain.   8/13/24: Patient seen today for follow up. He continues on carbo and RT and is overall doing well. He has a small amount of throat discomfort but is still able to eat his usual foods and is maintaining his nutrition well. He denies F/C/N/V/D.   8/20/24: Patient seen today for follow up accompanied by his wife. He is here for C7 carbo. They report that he has ongoing skin desquamation on his neck due to radiation and is applying desitin. He has some throat discomfort but is still able to eat well. Denies F/C/N/V/D.   8/27/24: Patient seen today for follow up accompanied by his wife. He completed yesterday and is here today for IVF hydration. 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 He reports mild throat pain but otherwise is feeling well, ate [place] food yesterday and is able to eat regular food without much pain. Denies F/C/N/V.    Disease: head and neck cancer   Pathology: sq cell ca   TNM stage: T4, N3, Mx AJCC Stage: III/IV   [de-identified] : sq cell ca

## 2024-08-27 NOTE — ASSESSMENT
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 Therefore, pt was started on chemo and RT.  Carboplatin was chosen because of baseline elevated creatinine at 1.3. He started this chemo on July 10th 2024. Creatinine has improved to normal levels now. Pt has been receiving IV hydration weekly and this may been instrumental in normalizing renal function.  He completed 7 cycles of carboplatin with concurrent RT August 2024.   Plan:  -Proceed with IVF hydration today. Given patient is overall doing well with no issue tolerating PO, okay to cancel hydration appt for 8/30 -f/u with rad onc -Radiation dermatitis: continue applying Desitin  -We reiterated the importance of timely reporting of AEs OV in 3-4 weeks via TEB

## 2024-08-27 NOTE — PHYSICAL EXAM
[Fully active, able to carry on all pre-disease performance without restriction] : Status 0 - Fully active, able to carry on all pre-disease performance without restriction [Normal] : affect appropriate [de-identified] : rt neck mass remarkably smaller. Skin desquamation 2/2 RT

## 2024-08-30 ENCOUNTER — APPOINTMENT (OUTPATIENT)
Dept: INFUSION THERAPY | Facility: HOSPITAL | Age: 64
End: 2024-08-30

## 2024-08-30 ENCOUNTER — APPOINTMENT (OUTPATIENT)
Dept: HEMATOLOGY ONCOLOGY | Facility: CLINIC | Age: 64
End: 2024-08-30

## 2024-09-04 ENCOUNTER — NON-APPOINTMENT (OUTPATIENT)
Age: 64
End: 2024-09-04

## 2024-09-20 ENCOUNTER — OUTPATIENT (OUTPATIENT)
Dept: OUTPATIENT SERVICES | Facility: HOSPITAL | Age: 64
LOS: 1 days | Discharge: HOME | End: 2024-09-20

## 2024-09-20 DIAGNOSIS — Z98.890 OTHER SPECIFIED POSTPROCEDURAL STATES: Chronic | ICD-10-CM

## 2024-09-20 DIAGNOSIS — C44.92 SQUAMOUS CELL CARCINOMA OF SKIN, UNSPECIFIED: ICD-10-CM

## 2024-09-20 DIAGNOSIS — Z98.2 PRESENCE OF CEREBROSPINAL FLUID DRAINAGE DEVICE: Chronic | ICD-10-CM

## 2024-09-26 ENCOUNTER — APPOINTMENT (OUTPATIENT)
Dept: HEMATOLOGY ONCOLOGY | Facility: CLINIC | Age: 64
End: 2024-09-26

## 2024-09-26 PROCEDURE — G2211 COMPLEX E/M VISIT ADD ON: CPT | Mod: NC

## 2024-09-26 PROCEDURE — 99213 OFFICE O/P EST LOW 20 MIN: CPT

## 2024-10-09 ENCOUNTER — APPOINTMENT (OUTPATIENT)
Dept: RADIATION ONCOLOGY | Facility: CLINIC | Age: 64
End: 2024-10-09
Payer: COMMERCIAL

## 2024-10-09 ENCOUNTER — APPOINTMENT (OUTPATIENT)
Dept: OTOLARYNGOLOGY | Facility: CLINIC | Age: 64
End: 2024-10-09
Payer: COMMERCIAL

## 2024-10-09 VITALS
HEIGHT: 71 IN | HEART RATE: 74 BPM | RESPIRATION RATE: 16 BRPM | WEIGHT: 233.25 LBS | SYSTOLIC BLOOD PRESSURE: 132 MMHG | DIASTOLIC BLOOD PRESSURE: 77 MMHG | OXYGEN SATURATION: 97 % | BODY MASS INDEX: 32.65 KG/M2

## 2024-10-09 PROCEDURE — 99024 POSTOP FOLLOW-UP VISIT: CPT

## 2024-10-09 PROCEDURE — 92526 ORAL FUNCTION THERAPY: CPT | Mod: GN

## 2024-10-14 ENCOUNTER — APPOINTMENT (OUTPATIENT)
Dept: OTOLARYNGOLOGY | Facility: CLINIC | Age: 64
End: 2024-10-14
Payer: COMMERCIAL

## 2024-10-14 VITALS
BODY MASS INDEX: 30.8 KG/M2 | WEIGHT: 220 LBS | HEIGHT: 71 IN | HEART RATE: 86 BPM | SYSTOLIC BLOOD PRESSURE: 117 MMHG | OXYGEN SATURATION: 94 % | DIASTOLIC BLOOD PRESSURE: 77 MMHG

## 2024-10-14 PROCEDURE — 31575 DIAGNOSTIC LARYNGOSCOPY: CPT

## 2024-10-14 PROCEDURE — 99214 OFFICE O/P EST MOD 30 MIN: CPT | Mod: 25

## 2024-10-18 ENCOUNTER — APPOINTMENT (OUTPATIENT)
Dept: UROLOGY | Facility: CLINIC | Age: 64
End: 2024-10-18
Payer: COMMERCIAL

## 2024-10-18 VITALS
TEMPERATURE: 97.2 F | HEART RATE: 82 BPM | SYSTOLIC BLOOD PRESSURE: 132 MMHG | RESPIRATION RATE: 16 BRPM | DIASTOLIC BLOOD PRESSURE: 83 MMHG

## 2024-10-18 DIAGNOSIS — R97.20 ELEVATED PROSTATE, SPECIFIC ANTIGEN [PSA]: ICD-10-CM

## 2024-10-18 PROCEDURE — G2211 COMPLEX E/M VISIT ADD ON: CPT | Mod: NC

## 2024-10-18 PROCEDURE — 99212 OFFICE O/P EST SF 10 MIN: CPT

## 2024-11-25 ENCOUNTER — APPOINTMENT (OUTPATIENT)
Dept: OTOLARYNGOLOGY | Facility: CLINIC | Age: 64
End: 2024-11-25

## 2024-11-26 ENCOUNTER — APPOINTMENT (OUTPATIENT)
Dept: NUCLEAR MEDICINE | Facility: IMAGING CENTER | Age: 64
End: 2024-11-26
Payer: COMMERCIAL

## 2024-11-26 ENCOUNTER — OUTPATIENT (OUTPATIENT)
Dept: OUTPATIENT SERVICES | Facility: HOSPITAL | Age: 64
LOS: 1 days | End: 2024-11-26
Payer: COMMERCIAL

## 2024-11-26 DIAGNOSIS — C10.9 MALIGNANT NEOPLASM OF OROPHARYNX, UNSPECIFIED: ICD-10-CM

## 2024-11-26 DIAGNOSIS — Z98.890 OTHER SPECIFIED POSTPROCEDURAL STATES: Chronic | ICD-10-CM

## 2024-11-26 DIAGNOSIS — Z98.2 PRESENCE OF CEREBROSPINAL FLUID DRAINAGE DEVICE: Chronic | ICD-10-CM

## 2024-11-26 DIAGNOSIS — Z00.8 ENCOUNTER FOR OTHER GENERAL EXAMINATION: ICD-10-CM

## 2024-11-26 PROCEDURE — 78815 PET IMAGE W/CT SKULL-THIGH: CPT | Mod: 26,PS

## 2024-11-26 PROCEDURE — 78815 PET IMAGE W/CT SKULL-THIGH: CPT

## 2024-11-26 PROCEDURE — A9552: CPT

## 2024-12-09 ENCOUNTER — APPOINTMENT (OUTPATIENT)
Dept: RADIATION ONCOLOGY | Facility: CLINIC | Age: 64
End: 2024-12-09
Payer: COMMERCIAL

## 2024-12-09 VITALS
OXYGEN SATURATION: 96 % | TEMPERATURE: 96.2 F | WEIGHT: 232.03 LBS | SYSTOLIC BLOOD PRESSURE: 121 MMHG | HEIGHT: 71 IN | DIASTOLIC BLOOD PRESSURE: 78 MMHG | BODY MASS INDEX: 32.48 KG/M2 | RESPIRATION RATE: 16 BRPM | HEART RATE: 84 BPM

## 2024-12-09 PROCEDURE — 99214 OFFICE O/P EST MOD 30 MIN: CPT

## 2024-12-16 ENCOUNTER — APPOINTMENT (OUTPATIENT)
Dept: OTOLARYNGOLOGY | Facility: CLINIC | Age: 64
End: 2024-12-16
Payer: COMMERCIAL

## 2024-12-16 VITALS
HEART RATE: 70 BPM | OXYGEN SATURATION: 96 % | WEIGHT: 232 LBS | DIASTOLIC BLOOD PRESSURE: 85 MMHG | HEIGHT: 71 IN | SYSTOLIC BLOOD PRESSURE: 134 MMHG | BODY MASS INDEX: 32.48 KG/M2

## 2024-12-16 PROCEDURE — 99214 OFFICE O/P EST MOD 30 MIN: CPT | Mod: 25

## 2024-12-16 PROCEDURE — 31575 DIAGNOSTIC LARYNGOSCOPY: CPT

## 2024-12-18 ENCOUNTER — OUTPATIENT (OUTPATIENT)
Dept: OUTPATIENT SERVICES | Facility: HOSPITAL | Age: 64
LOS: 1 days | Discharge: HOME | End: 2024-12-18

## 2024-12-18 DIAGNOSIS — Z98.2 PRESENCE OF CEREBROSPINAL FLUID DRAINAGE DEVICE: Chronic | ICD-10-CM

## 2024-12-18 DIAGNOSIS — C44.92 SQUAMOUS CELL CARCINOMA OF SKIN, UNSPECIFIED: ICD-10-CM

## 2024-12-18 DIAGNOSIS — Z98.890 OTHER SPECIFIED POSTPROCEDURAL STATES: Chronic | ICD-10-CM

## 2024-12-19 ENCOUNTER — RESULT REVIEW (OUTPATIENT)
Age: 64
End: 2024-12-19

## 2024-12-19 ENCOUNTER — APPOINTMENT (OUTPATIENT)
Dept: HEMATOLOGY ONCOLOGY | Facility: CLINIC | Age: 64
End: 2024-12-19
Payer: COMMERCIAL

## 2024-12-19 VITALS
OXYGEN SATURATION: 95 % | TEMPERATURE: 97.5 F | WEIGHT: 236.54 LBS | DIASTOLIC BLOOD PRESSURE: 74 MMHG | SYSTOLIC BLOOD PRESSURE: 122 MMHG | RESPIRATION RATE: 16 BRPM | HEART RATE: 89 BPM | BODY MASS INDEX: 32.99 KG/M2

## 2024-12-19 DIAGNOSIS — J39.2 OTHER DISEASES OF PHARYNX: ICD-10-CM

## 2024-12-19 LAB
BASOPHILS # BLD AUTO: 0.03 K/UL — SIGNIFICANT CHANGE UP (ref 0–0.2)
BASOPHILS NFR BLD AUTO: 0.5 % — SIGNIFICANT CHANGE UP (ref 0–2)
EOSINOPHIL # BLD AUTO: 0.13 K/UL — SIGNIFICANT CHANGE UP (ref 0–0.5)
EOSINOPHIL NFR BLD AUTO: 2 % — SIGNIFICANT CHANGE UP (ref 0–6)
HCT VFR BLD CALC: 38.8 % — LOW (ref 39–50)
HGB BLD-MCNC: 13.5 G/DL — SIGNIFICANT CHANGE UP (ref 13–17)
IMM GRANULOCYTES NFR BLD AUTO: 0.6 % — SIGNIFICANT CHANGE UP (ref 0–0.9)
LYMPHOCYTES # BLD AUTO: 1.17 K/UL — SIGNIFICANT CHANGE UP (ref 1–3.3)
LYMPHOCYTES # BLD AUTO: 18.2 % — SIGNIFICANT CHANGE UP (ref 13–44)
MCHC RBC-ENTMCNC: 33.8 PG — SIGNIFICANT CHANGE UP (ref 27–34)
MCHC RBC-ENTMCNC: 34.8 G/DL — SIGNIFICANT CHANGE UP (ref 32–36)
MCV RBC AUTO: 97.2 FL — SIGNIFICANT CHANGE UP (ref 80–100)
MONOCYTES # BLD AUTO: 0.73 K/UL — SIGNIFICANT CHANGE UP (ref 0–0.9)
MONOCYTES NFR BLD AUTO: 11.4 % — SIGNIFICANT CHANGE UP (ref 2–14)
NEUTROPHILS # BLD AUTO: 4.32 K/UL — SIGNIFICANT CHANGE UP (ref 1.8–7.4)
NEUTROPHILS NFR BLD AUTO: 67.3 % — SIGNIFICANT CHANGE UP (ref 43–77)
NRBC # BLD: 0 /100 WBCS — SIGNIFICANT CHANGE UP (ref 0–0)
NRBC BLD-RTO: 0 /100 WBCS — SIGNIFICANT CHANGE UP (ref 0–0)
PLATELET # BLD AUTO: 198 K/UL — SIGNIFICANT CHANGE UP (ref 150–400)
RBC # BLD: 3.99 M/UL — LOW (ref 4.2–5.8)
RBC # FLD: 12.5 % — SIGNIFICANT CHANGE UP (ref 10.3–14.5)
WBC # BLD: 6.42 K/UL — SIGNIFICANT CHANGE UP (ref 3.8–10.5)
WBC # FLD AUTO: 6.42 K/UL — SIGNIFICANT CHANGE UP (ref 3.8–10.5)

## 2024-12-19 PROCEDURE — 99213 OFFICE O/P EST LOW 20 MIN: CPT

## 2024-12-19 PROCEDURE — G2211 COMPLEX E/M VISIT ADD ON: CPT | Mod: NC

## 2024-12-20 DIAGNOSIS — C10.9 MALIGNANT NEOPLASM OF OROPHARYNX, UNSPECIFIED: ICD-10-CM

## 2024-12-20 LAB
ALBUMIN SERPL ELPH-MCNC: 4.5 G/DL
ALP BLD-CCNC: 75 U/L
ALT SERPL-CCNC: 24 U/L
ANION GAP SERPL CALC-SCNC: 14 MMOL/L
AST SERPL-CCNC: 14 U/L
BILIRUB SERPL-MCNC: 1.6 MG/DL
BUN SERPL-MCNC: 20 MG/DL
CALCIUM SERPL-MCNC: 10 MG/DL
CHLORIDE SERPL-SCNC: 105 MMOL/L
CO2 SERPL-SCNC: 25 MMOL/L
CREAT SERPL-MCNC: 1.16 MG/DL
EGFR: 71 ML/MIN/1.73M2
GLUCOSE SERPL-MCNC: 89 MG/DL
MAGNESIUM SERPL-MCNC: 2.1 MG/DL
POTASSIUM SERPL-SCNC: 3.4 MMOL/L
PROT SERPL-MCNC: 7.4 G/DL
SODIUM SERPL-SCNC: 145 MMOL/L
TSH SERPL-ACNC: 2.47 UIU/ML

## 2024-12-20 RX ORDER — POTASSIUM CHLORIDE 1.5 G/1.58G
20 POWDER, FOR SOLUTION ORAL DAILY
Qty: 1 | Refills: 0 | Status: ACTIVE | COMMUNITY
Start: 2024-12-20 | End: 1900-01-01

## 2025-01-13 ENCOUNTER — APPOINTMENT (OUTPATIENT)
Dept: ULTRASOUND IMAGING | Facility: IMAGING CENTER | Age: 65
End: 2025-01-13
Payer: COMMERCIAL

## 2025-01-13 ENCOUNTER — OUTPATIENT (OUTPATIENT)
Dept: OUTPATIENT SERVICES | Facility: HOSPITAL | Age: 65
LOS: 1 days | End: 2025-01-13
Payer: COMMERCIAL

## 2025-01-13 DIAGNOSIS — Z98.2 PRESENCE OF CEREBROSPINAL FLUID DRAINAGE DEVICE: Chronic | ICD-10-CM

## 2025-01-13 DIAGNOSIS — Z98.890 OTHER SPECIFIED POSTPROCEDURAL STATES: Chronic | ICD-10-CM

## 2025-01-13 PROCEDURE — 93880 EXTRACRANIAL BILAT STUDY: CPT

## 2025-01-13 PROCEDURE — 93880 EXTRACRANIAL BILAT STUDY: CPT | Mod: 26

## 2025-03-12 ENCOUNTER — APPOINTMENT (OUTPATIENT)
Dept: CT IMAGING | Facility: IMAGING CENTER | Age: 65
End: 2025-03-12
Payer: COMMERCIAL

## 2025-03-12 ENCOUNTER — OUTPATIENT (OUTPATIENT)
Dept: OUTPATIENT SERVICES | Facility: HOSPITAL | Age: 65
LOS: 1 days | End: 2025-03-12
Payer: COMMERCIAL

## 2025-03-12 DIAGNOSIS — Z98.2 PRESENCE OF CEREBROSPINAL FLUID DRAINAGE DEVICE: Chronic | ICD-10-CM

## 2025-03-12 DIAGNOSIS — Z98.890 OTHER SPECIFIED POSTPROCEDURAL STATES: Chronic | ICD-10-CM

## 2025-03-12 DIAGNOSIS — C10.9 MALIGNANT NEOPLASM OF OROPHARYNX, UNSPECIFIED: ICD-10-CM

## 2025-03-12 PROCEDURE — 70491 CT SOFT TISSUE NECK W/DYE: CPT

## 2025-03-12 PROCEDURE — 71260 CT THORAX DX C+: CPT | Mod: 26

## 2025-03-12 PROCEDURE — 71260 CT THORAX DX C+: CPT

## 2025-03-12 PROCEDURE — 70491 CT SOFT TISSUE NECK W/DYE: CPT | Mod: 26

## 2025-03-17 ENCOUNTER — APPOINTMENT (OUTPATIENT)
Dept: RADIATION ONCOLOGY | Facility: CLINIC | Age: 65
End: 2025-03-17
Payer: COMMERCIAL

## 2025-03-17 ENCOUNTER — APPOINTMENT (OUTPATIENT)
Dept: OTOLARYNGOLOGY | Facility: CLINIC | Age: 65
End: 2025-03-17
Payer: COMMERCIAL

## 2025-03-17 ENCOUNTER — OUTPATIENT (OUTPATIENT)
Dept: OUTPATIENT SERVICES | Facility: HOSPITAL | Age: 65
LOS: 1 days | Discharge: HOME | End: 2025-03-17

## 2025-03-17 VITALS
HEIGHT: 71 IN | BODY MASS INDEX: 33.64 KG/M2 | TEMPERATURE: 96.3 F | SYSTOLIC BLOOD PRESSURE: 120 MMHG | RESPIRATION RATE: 17 BRPM | HEART RATE: 85 BPM | WEIGHT: 240.3 LBS | DIASTOLIC BLOOD PRESSURE: 76 MMHG | OXYGEN SATURATION: 96 %

## 2025-03-17 VITALS
SYSTOLIC BLOOD PRESSURE: 124 MMHG | DIASTOLIC BLOOD PRESSURE: 75 MMHG | WEIGHT: 240 LBS | HEIGHT: 71 IN | OXYGEN SATURATION: 96 % | BODY MASS INDEX: 33.6 KG/M2 | HEART RATE: 74 BPM

## 2025-03-17 DIAGNOSIS — Z98.890 OTHER SPECIFIED POSTPROCEDURAL STATES: Chronic | ICD-10-CM

## 2025-03-17 DIAGNOSIS — Z98.2 PRESENCE OF CEREBROSPINAL FLUID DRAINAGE DEVICE: Chronic | ICD-10-CM

## 2025-03-17 DIAGNOSIS — C44.92 SQUAMOUS CELL CARCINOMA OF SKIN, UNSPECIFIED: ICD-10-CM

## 2025-03-17 PROCEDURE — 99215 OFFICE O/P EST HI 40 MIN: CPT

## 2025-03-17 PROCEDURE — 99214 OFFICE O/P EST MOD 30 MIN: CPT | Mod: 25

## 2025-03-17 PROCEDURE — 31575 DIAGNOSTIC LARYNGOSCOPY: CPT

## 2025-03-18 ENCOUNTER — RESULT REVIEW (OUTPATIENT)
Age: 65
End: 2025-03-18

## 2025-03-18 ENCOUNTER — NON-APPOINTMENT (OUTPATIENT)
Age: 65
End: 2025-03-18

## 2025-03-18 ENCOUNTER — APPOINTMENT (OUTPATIENT)
Dept: HEMATOLOGY ONCOLOGY | Facility: CLINIC | Age: 65
End: 2025-03-18
Payer: COMMERCIAL

## 2025-03-18 VITALS
RESPIRATION RATE: 17 BRPM | WEIGHT: 238.08 LBS | HEIGHT: 71 IN | HEART RATE: 78 BPM | SYSTOLIC BLOOD PRESSURE: 131 MMHG | TEMPERATURE: 97.3 F | BODY MASS INDEX: 33.33 KG/M2 | OXYGEN SATURATION: 95 % | DIASTOLIC BLOOD PRESSURE: 84 MMHG

## 2025-03-18 DIAGNOSIS — C10.9 MALIGNANT NEOPLASM OF OROPHARYNX, UNSPECIFIED: ICD-10-CM

## 2025-03-18 DIAGNOSIS — C44.42 SQUAMOUS CELL CARCINOMA OF SKIN OF SCALP AND NECK: ICD-10-CM

## 2025-03-18 LAB
BASOPHILS # BLD AUTO: 0.05 K/UL — SIGNIFICANT CHANGE UP (ref 0–0.2)
BASOPHILS NFR BLD AUTO: 0.9 % — SIGNIFICANT CHANGE UP (ref 0–2)
EOSINOPHIL # BLD AUTO: 0.11 K/UL — SIGNIFICANT CHANGE UP (ref 0–0.5)
EOSINOPHIL NFR BLD AUTO: 2.1 % — SIGNIFICANT CHANGE UP (ref 0–6)
HCT VFR BLD CALC: 41.3 % — SIGNIFICANT CHANGE UP (ref 39–50)
HGB BLD-MCNC: 14.2 G/DL — SIGNIFICANT CHANGE UP (ref 13–17)
IMM GRANULOCYTES NFR BLD AUTO: 0.6 % — SIGNIFICANT CHANGE UP (ref 0–0.9)
LYMPHOCYTES # BLD AUTO: 0.91 K/UL — LOW (ref 1–3.3)
LYMPHOCYTES # BLD AUTO: 17.1 % — SIGNIFICANT CHANGE UP (ref 13–44)
MCHC RBC-ENTMCNC: 33.2 PG — SIGNIFICANT CHANGE UP (ref 27–34)
MCHC RBC-ENTMCNC: 34.4 G/DL — SIGNIFICANT CHANGE UP (ref 32–36)
MCV RBC AUTO: 96.5 FL — SIGNIFICANT CHANGE UP (ref 80–100)
MONOCYTES # BLD AUTO: 0.64 K/UL — SIGNIFICANT CHANGE UP (ref 0–0.9)
MONOCYTES NFR BLD AUTO: 12 % — SIGNIFICANT CHANGE UP (ref 2–14)
NEUTROPHILS # BLD AUTO: 3.58 K/UL — SIGNIFICANT CHANGE UP (ref 1.8–7.4)
NEUTROPHILS NFR BLD AUTO: 67.3 % — SIGNIFICANT CHANGE UP (ref 43–77)
NRBC BLD AUTO-RTO: 0 /100 WBCS — SIGNIFICANT CHANGE UP (ref 0–0)
PLATELET # BLD AUTO: 222 K/UL — SIGNIFICANT CHANGE UP (ref 150–400)
RBC # BLD: 4.28 M/UL — SIGNIFICANT CHANGE UP (ref 4.2–5.8)
RBC # FLD: 13 % — SIGNIFICANT CHANGE UP (ref 10.3–14.5)
WBC # BLD: 5.32 K/UL — SIGNIFICANT CHANGE UP (ref 3.8–10.5)
WBC # FLD AUTO: 5.32 K/UL — SIGNIFICANT CHANGE UP (ref 3.8–10.5)

## 2025-03-18 PROCEDURE — 99213 OFFICE O/P EST LOW 20 MIN: CPT

## 2025-03-18 PROCEDURE — G2211 COMPLEX E/M VISIT ADD ON: CPT | Mod: NC

## 2025-03-19 LAB
ALBUMIN SERPL ELPH-MCNC: 4.5 G/DL
ALP BLD-CCNC: 75 U/L
ALT SERPL-CCNC: 25 U/L
ANION GAP SERPL CALC-SCNC: 11 MMOL/L
AST SERPL-CCNC: 15 U/L
BILIRUB SERPL-MCNC: 1.3 MG/DL
BUN SERPL-MCNC: 16 MG/DL
CALCIUM SERPL-MCNC: 10.1 MG/DL
CHLORIDE SERPL-SCNC: 105 MMOL/L
CO2 SERPL-SCNC: 28 MMOL/L
CREAT SERPL-MCNC: 1.09 MG/DL
EGFRCR SERPLBLD CKD-EPI 2021: 77 ML/MIN/1.73M2
GLUCOSE SERPL-MCNC: 111 MG/DL
MAGNESIUM SERPL-MCNC: 2.3 MG/DL
POTASSIUM SERPL-SCNC: 3.9 MMOL/L
PROT SERPL-MCNC: 7.3 G/DL
SODIUM SERPL-SCNC: 145 MMOL/L
TSH SERPL-ACNC: 3.15 UIU/ML

## 2025-04-18 ENCOUNTER — APPOINTMENT (OUTPATIENT)
Dept: UROLOGY | Facility: CLINIC | Age: 65
End: 2025-04-18
Payer: COMMERCIAL

## 2025-04-18 DIAGNOSIS — R97.20 ELEVATED PROSTATE, SPECIFIC ANTIGEN [PSA]: ICD-10-CM

## 2025-04-18 PROCEDURE — 99212 OFFICE O/P EST SF 10 MIN: CPT

## 2025-07-14 ENCOUNTER — APPOINTMENT (OUTPATIENT)
Dept: OTOLARYNGOLOGY | Facility: CLINIC | Age: 65
End: 2025-07-14
Payer: COMMERCIAL

## 2025-07-14 VITALS
BODY MASS INDEX: 32.2 KG/M2 | DIASTOLIC BLOOD PRESSURE: 77 MMHG | WEIGHT: 230 LBS | HEART RATE: 73 BPM | OXYGEN SATURATION: 96 % | HEIGHT: 71 IN | SYSTOLIC BLOOD PRESSURE: 120 MMHG

## 2025-07-14 PROCEDURE — 99214 OFFICE O/P EST MOD 30 MIN: CPT | Mod: 25

## 2025-07-14 PROCEDURE — 31575 DIAGNOSTIC LARYNGOSCOPY: CPT

## 2025-07-17 ENCOUNTER — APPOINTMENT (OUTPATIENT)
Dept: RADIATION ONCOLOGY | Facility: CLINIC | Age: 65
End: 2025-07-17

## 2025-07-17 VITALS
SYSTOLIC BLOOD PRESSURE: 143 MMHG | HEART RATE: 72 BPM | WEIGHT: 243.28 LBS | BODY MASS INDEX: 33.93 KG/M2 | RESPIRATION RATE: 17 BRPM | DIASTOLIC BLOOD PRESSURE: 82 MMHG | OXYGEN SATURATION: 95 %

## 2025-07-17 PROCEDURE — 99215 OFFICE O/P EST HI 40 MIN: CPT

## 2025-09-08 ENCOUNTER — APPOINTMENT (OUTPATIENT)
Dept: NUCLEAR MEDICINE | Facility: IMAGING CENTER | Age: 65
End: 2025-09-08
Payer: COMMERCIAL

## 2025-09-08 PROCEDURE — 78815 PET IMAGE W/CT SKULL-THIGH: CPT | Mod: 26,PS

## 2025-09-15 ENCOUNTER — APPOINTMENT (OUTPATIENT)
Dept: OTOLARYNGOLOGY | Facility: CLINIC | Age: 65
End: 2025-09-15
Payer: COMMERCIAL

## 2025-09-15 ENCOUNTER — APPOINTMENT (OUTPATIENT)
Dept: OTOLARYNGOLOGY | Facility: CLINIC | Age: 65
End: 2025-09-15

## 2025-09-15 ENCOUNTER — NON-APPOINTMENT (OUTPATIENT)
Age: 65
End: 2025-09-15

## 2025-09-15 VITALS
HEART RATE: 84 BPM | DIASTOLIC BLOOD PRESSURE: 83 MMHG | BODY MASS INDEX: 31.5 KG/M2 | HEIGHT: 71 IN | WEIGHT: 225 LBS | SYSTOLIC BLOOD PRESSURE: 126 MMHG

## 2025-09-15 PROCEDURE — 99214 OFFICE O/P EST MOD 30 MIN: CPT | Mod: 25

## 2025-09-15 PROCEDURE — 31575 DIAGNOSTIC LARYNGOSCOPY: CPT
